# Patient Record
Sex: MALE | Race: BLACK OR AFRICAN AMERICAN | NOT HISPANIC OR LATINO | Employment: OTHER | URBAN - METROPOLITAN AREA
[De-identification: names, ages, dates, MRNs, and addresses within clinical notes are randomized per-mention and may not be internally consistent; named-entity substitution may affect disease eponyms.]

---

## 2020-09-02 LAB — HEMOCCULT STL QL IA: NEGATIVE

## 2020-10-06 ENCOUNTER — PATIENT MESSAGE (OUTPATIENT)
Dept: HEMATOLOGY/ONCOLOGY | Facility: CLINIC | Age: 75
End: 2020-10-06

## 2020-10-06 ENCOUNTER — OFFICE VISIT (OUTPATIENT)
Dept: HEMATOLOGY/ONCOLOGY | Facility: CLINIC | Age: 75
End: 2020-10-06
Payer: OTHER GOVERNMENT

## 2020-10-06 VITALS
BODY MASS INDEX: 19.23 KG/M2 | OXYGEN SATURATION: 100 % | WEIGHT: 137.38 LBS | HEIGHT: 71 IN | TEMPERATURE: 100 F | DIASTOLIC BLOOD PRESSURE: 62 MMHG | SYSTOLIC BLOOD PRESSURE: 143 MMHG | HEART RATE: 113 BPM

## 2020-10-06 DIAGNOSIS — G62.0 NEUROPATHY DUE TO CHEMOTHERAPEUTIC DRUG: ICD-10-CM

## 2020-10-06 DIAGNOSIS — R63.0 ANOREXIA: ICD-10-CM

## 2020-10-06 DIAGNOSIS — C79.51 PROSTATE CANCER METASTATIC TO BONE: Primary | ICD-10-CM

## 2020-10-06 DIAGNOSIS — G47.09 OTHER INSOMNIA: ICD-10-CM

## 2020-10-06 DIAGNOSIS — E11.9 TYPE 2 DIABETES MELLITUS WITHOUT COMPLICATION, WITHOUT LONG-TERM CURRENT USE OF INSULIN: ICD-10-CM

## 2020-10-06 DIAGNOSIS — T45.1X5A NEUROPATHY DUE TO CHEMOTHERAPEUTIC DRUG: ICD-10-CM

## 2020-10-06 DIAGNOSIS — D75.89 BICYTOPENIA: ICD-10-CM

## 2020-10-06 DIAGNOSIS — M62.441 CONTRACTURE OF MUSCLE OF RIGHT HAND: ICD-10-CM

## 2020-10-06 DIAGNOSIS — C61 PROSTATE CANCER METASTATIC TO BONE: Primary | ICD-10-CM

## 2020-10-06 PROCEDURE — 99999 PR PBB SHADOW E&M-NEW PATIENT-LVL V: ICD-10-PCS | Mod: PBBFAC,,, | Performed by: INTERNAL MEDICINE

## 2020-10-06 PROCEDURE — 99204 PR OFFICE/OUTPT VISIT, NEW, LEVL IV, 45-59 MIN: ICD-10-PCS | Mod: S$GLB,,, | Performed by: INTERNAL MEDICINE

## 2020-10-06 PROCEDURE — 99204 OFFICE O/P NEW MOD 45 MIN: CPT | Mod: S$GLB,,, | Performed by: INTERNAL MEDICINE

## 2020-10-06 PROCEDURE — 99999 PR PBB SHADOW E&M-NEW PATIENT-LVL V: CPT | Mod: PBBFAC,,, | Performed by: INTERNAL MEDICINE

## 2020-10-06 RX ORDER — METFORMIN HYDROCHLORIDE 500 MG/1
500 TABLET ORAL 2 TIMES DAILY WITH MEALS
COMMUNITY
End: 2020-11-05

## 2020-10-06 RX ORDER — PREGABALIN 75 MG/1
75 CAPSULE ORAL 2 TIMES DAILY
Qty: 60 CAPSULE | Refills: 6 | Status: SHIPPED | OUTPATIENT
Start: 2020-10-06 | End: 2020-10-08 | Stop reason: DRUGHIGH

## 2020-10-06 RX ORDER — DRONABINOL 2.5 MG/1
2.5 CAPSULE ORAL
COMMUNITY
End: 2020-10-06 | Stop reason: SDUPTHER

## 2020-10-06 RX ORDER — DRONABINOL 2.5 MG/1
2.5 CAPSULE ORAL
Qty: 90 CAPSULE | Refills: 5 | Status: SHIPPED | OUTPATIENT
Start: 2020-10-06 | End: 2020-10-07 | Stop reason: SDUPTHER

## 2020-10-06 RX ORDER — GABAPENTIN 100 MG/1
CAPSULE ORAL
COMMUNITY
End: 2020-10-06 | Stop reason: ALTCHOICE

## 2020-10-06 NOTE — Clinical Note
The patient will need labs in the morning on Thursday at 11 with CBC, CMP and PSA.  He will need CT scans of the C/A/P and a NM Bone scan after his labs are done.  He will need an appt with an orthopedist concerning contractures of his right hand.  He will need an appointment with an oncology nutritionist.  He will need a return appt with me once the scans are completed.

## 2020-10-06 NOTE — PROGRESS NOTES
PATIENT: Colten Franco  MRN: [id number]  DATE: 10/6/2020      Diagnosis:   1. Prostate cancer metastatic to bone    2. Bicytopenia    3. Contracture of muscle of right hand    4. Neuropathy due to chemotherapeutic drug    5. Anorexia    6. Type 2 diabetes mellitus without complication, without long-term current use of insulin    7. Other insomnia        Chief Complaint: Prostate Cancer        Subjective:    Initial History: Mr. Franco is a 75 y.o. male with neuropathy, DMII presents to establish care for metastatic prostate cancer.  The patient was originally diagnosed with prostate cancer in 1998 s/p prostatectomy.  He later developed metastatic cancer to the bone.  He was previously treated by Dr. Christiansen in [facility]. Selvin has been previously treated with Lupron, palliative radiation, provenge (xofigo), zytiga (abiratoerone) and prednisone, enzalutamide in combination with [id number] (EZH2 inhibitor) as part of a phase ½ trial, CKO871 trial (start 3/03/20) from which the patient developed hand and foot syndrome after the second cycle.  Most recently the patient was treated with casodex and Lupron starting 9/10/20 after his PSA continued to climb.  Last scans reported in his outside records on 4/03/20 showed extensive blastic osseous disease with no evidence of new disease and a stable AAA measuring 3.5cm.    The patient states he was recently admitted to the hospital in [place] from 8/27/20 to 9/03/20 for failure to thrive.  During this admission he states CT scans and a NM bone scan was done.  He states he also received a blood transfusion  Currently he states he has chronic pain in his feet and right hand which has been present since the OSG354 trial.  He is taking neurontin 100mg BID without relief.  He endorses weight loss and decrease in appetite.  He endorses occasional nausea.  The patient denies CP, SOB, abdominal pain, vomiting, constipation, diarrhea.  The patient denies fever, chills, night  sweats, new lumps or bumps, easy bruising or bleeding.  He states he has limited mobility and spends most of the day in a chair or bed.    Past Medical History:   Past Medical History:   Diagnosis Date    DMII (diabetes mellitus, type 2)     Neuropathy due to chemotherapeutic drug     Prostate cancer        Past Surgical HIstory:   Past Surgical History:   Procedure Laterality Date    PROSTATECTOMY         Family History: History reviewed. No pertinent family history.    Social History:  reports that he has been smoking cigarettes. He has a 25.00 pack-year smoking history. He does not have any smokeless tobacco history on file. He reports current alcohol use.    Allergies:  Review of patient's allergies indicates:  No Known Allergies    Medications:  Current Outpatient Medications   Medication Sig Dispense Refill    dronabinoL (MARINOL) 2.5 MG capsule Take 1 capsule (2.5 mg total) by mouth 2 (two) times daily before meals. Take 1 capsule (2.5mg total) by mouth in the AM before breakfast, Take 2 capsules (5mg total) by mouth in the PM before dinner. 90 capsule 5    metFORMIN (GLUCOPHAGE) 500 MG tablet Take 500 mg by mouth 2 (two) times daily with meals.      ondansetron (ZOFRAN) IVPB       pantoprazole sodium (PANTOPRAZOLE ORAL)       zolpidem 5 mg Subl       pregabalin (LYRICA) 75 MG capsule Take 1 capsule (75 mg total) by mouth 2 (two) times daily. 60 capsule 6     No current facility-administered medications for this visit.        Review of Systems   Constitutional: Positive for appetite change (food does not taste the same) and unexpected weight change. Negative for chills and fever.   HENT: Negative for sore throat and trouble swallowing.    Eyes: Negative for photophobia and visual disturbance.   Respiratory: Negative for cough, chest tightness and shortness of breath.    Cardiovascular: Negative for chest pain, palpitations and leg swelling.   Gastrointestinal: Positive for nausea (on occasion).  "Negative for abdominal pain, constipation, diarrhea and vomiting.   Endocrine: Negative for cold intolerance and heat intolerance.   Genitourinary: Negative for difficulty urinating, dysuria and hematuria.   Musculoskeletal: Negative for arthralgias, back pain and myalgias.   Skin: Negative for color change and rash.   Neurological: Negative for dizziness, light-headedness, numbness and headaches.        Pain in right fer and in feet.   Hematological: Negative for adenopathy. Does not bruise/bleed easily.       ECOG Performance Status: 3   Objective:      Vitals:   Vitals:    10/06/20 1441   BP: (!) 143/62   BP Location: Right arm   Patient Position: Sitting   BP Method: Medium (Automatic)   Pulse: (!) 113   Temp: 99.7 °F (37.6 °C)   TempSrc: Oral   SpO2: 100%   Weight: 62.3 kg (137 lb 5.6 oz)   Height: 5' 11" (1.803 m)       Physical Exam  Constitutional:       General: He is not in acute distress.     Appearance: He is well-developed. He is not diaphoretic.      Comments: Thin, cachectic   HENT:      Head: Normocephalic and atraumatic.   Eyes:      General: No scleral icterus.        Right eye: No discharge.         Left eye: No discharge.   Cardiovascular:      Rate and Rhythm: Normal rate and regular rhythm.      Heart sounds: Normal heart sounds. No murmur. No friction rub. No gallop.    Pulmonary:      Effort: Pulmonary effort is normal. No respiratory distress.      Breath sounds: Normal breath sounds. No wheezing or rales.   Chest:      Chest wall: No tenderness.   Abdominal:      General: Bowel sounds are normal. There is no distension.      Palpations: Abdomen is soft. There is no mass.      Tenderness: There is no abdominal tenderness. There is no rebound.   Musculoskeletal:         General: No tenderness.      Comments: Pt with contractures of the right hand with the fingers being unable to fully extend.  Pt with tightness in the right hand and feet   Skin:     General: Skin is warm and dry.      " Findings: No erythema or rash.   Neurological:      Mental Status: He is alert and oriented to person, place, and time.      Coordination: Coordination normal.   Psychiatric:         Behavior: Behavior normal.         Laboratory Data:  No visits with results within 1 Week(s) from this visit.   Latest known visit with results is:   No results found for any previous visit.         Imaging: Reviewed    Assessment:       1. Prostate cancer metastatic to bone    2. Bicytopenia    3. Contracture of muscle of right hand    4. Neuropathy due to chemotherapeutic drug    5. Anorexia    6. Type 2 diabetes mellitus without complication, without long-term current use of insulin    7. Other insomnia           Plan:     Prostate Cancer - The patient has metastatic prostate caner to the bone  -The patient was previously under the care of Dr Christiansen in [place], [place]  -He has received the following treatments: Lupron, palliative radiation, provenge (xofigo), zytiga (abiratoerone) and prednisone, enzalutamide in combination with [id number] (EZH2 inhibitor) as part of a phase ½ trial, HHU564 trial phase 1 (complicated with hand and foot)  -Last documented PSA in the outside records was 271ng/mL on 7/27/20  -The patient was started on Casodex and Lupron 9/10/20 by Dr Christiansen; however, the patient is not currently taking Casodex and last dose and strength of lupron is unclear based on outside records.  -Will get staging scans with NM bone scan and CT of the C/A/P  -Pt has a poor performance status which will affect future treatment decisions    Bicytopenia - The patient was previously noted to have anemia and thrombocytopenia  -HE states he received a blood transfusion during a recent hospitalization  -Will check blood counts  -Pt consented for blood transfusions today in case one is needed.    Contracture of the Right Hand - The patient has severe contractures of the right hand similar to that seen in scleroderma  -Will have the patient see  orthopedics to determine if there are any surgical procedures which can be done to help the patient with his dexterity    Neuropathic Pain - The patient is currently taking Gabapentin 100mg BID without relief  -HE states he was previously on lyrica which helped  -Will start the patient on lyrica 75mg BID and assess response.    Anorexia - Pt on marinol for appetite stimulation  -Will refill his prescription    DMII - PT on metformin  -No previous A1C  -Will monitor    Insomnia - pt on zolpidem but states it is not helping  -Will see if lyrica helps with neuropathic pain and insomnia before starting the patient on a different sleep aid    Advance Care Planning     Power of   I initiated the process of advance care planning today and explained the importance of this process to the patient.  I introduced the concept of advance directives to the patient, as well. Then the patient received detailed information about the importance of designating a Health Care Power of  (HCPOA). He was also instructed to communicate with this person about their wishes for future healthcare, should he become sick and lose decision-making capacity. The patient has not previously appointed a HCPOA. After our discussion, the patient has decided to complete a HCPOA and will bring the form completed to his next clinic appointment.          Follow Up - CBC, CMP and PSA this Thursday, CT scans of the C/A/P and a NM Bone scan, orthopedics appt, nutritionist appt, return appt with me once scans completed    Edgardo Alas MD  Hematology and Oncology  [facility]  Office:[phone]  Fax: [phone]

## 2020-10-07 DIAGNOSIS — R63.0 ANOREXIA: ICD-10-CM

## 2020-10-07 RX ORDER — DRONABINOL 2.5 MG/1
2.5 CAPSULE ORAL
Qty: 90 CAPSULE | Refills: 5 | Status: SHIPPED | OUTPATIENT
Start: 2020-10-07

## 2020-10-07 NOTE — TELEPHONE ENCOUNTER
----- Message from Edgardo Alas MD sent at 10/6/2020  3:30 PM CDT -----  The patient will need labs in the morning on Thursday at 11 with CBC, CMP and PSA.  He will need CT scans of the C/A/P and a NM Bone scan after his labs are done.  He will need an appt with an orthopedist concerning contractures of his right hand.  He will need an appointment with an oncology nutritionist.  He will need a return appt with me once the scans are completed.

## 2020-10-07 NOTE — TELEPHONE ENCOUNTER
Called pt to schedule NM bone scan. No answer. Left message for pt to call back to the office for scheduling. Pt will need to go to [facility].   ~Jesus

## 2020-10-08 ENCOUNTER — PATIENT MESSAGE (OUTPATIENT)
Dept: HEMATOLOGY/ONCOLOGY | Facility: CLINIC | Age: 75
End: 2020-10-08

## 2020-10-08 DIAGNOSIS — T45.1X5A NEUROPATHY DUE TO CHEMOTHERAPEUTIC DRUG: Primary | ICD-10-CM

## 2020-10-08 DIAGNOSIS — G62.0 NEUROPATHY DUE TO CHEMOTHERAPEUTIC DRUG: Primary | ICD-10-CM

## 2020-10-08 RX ORDER — PREGABALIN 25 MG/1
25 CAPSULE ORAL 2 TIMES DAILY
Qty: 90 CAPSULE | Refills: 2 | Status: SHIPPED | OUTPATIENT
Start: 2020-10-08 | End: 2021-10-08

## 2020-10-08 NOTE — TELEPHONE ENCOUNTER
The patient's daughter called and stated that her father has been having confusion since starting the Lyrica.  She states this morning he appeared to be having hallucinations and had difficulty urinating in the toilet. She also states he has been having fever with temperature up to 103F.  I instructed her to bring her father to the ER if he is having fever and confusion given the concern for potential meningitis.  She expressed understanding.  All questions were answered to her satisfaction.    Edgardo Alas MD  Hematology and Oncology  [facility]  Office:[phone]  Fax: [phone]

## 2020-10-09 ENCOUNTER — TELEPHONE (OUTPATIENT)
Dept: HEMATOLOGY/ONCOLOGY | Facility: CLINIC | Age: 75
End: 2020-10-09

## 2020-10-09 NOTE — TELEPHONE ENCOUNTER
TC to pts daughter deisi  Advised her bone scan machine I out of service on [facility] Inquired as to whether or not they can make an appointment for this procedure at [facility]  She agreed  TC to [facility] dept  Message left for Darinel to return call to schedule patient.  Pts dgt advised of this  Will contact her with appt date and time when arranged     TC to [facility]  appt scheduled for Thur Oct 15  For bone scan  at 10:30  Pts dgt notified Present to 2nd floor in hospital    Bone scan unit is now repaired so test changed from [facility] to her  Daughter of pt notified of such and agreed with arrangements

## 2020-10-12 ENCOUNTER — TELEPHONE (OUTPATIENT)
Dept: EMERGENCY MEDICINE | Facility: HOSPITAL | Age: 75
End: 2020-10-12

## 2020-10-12 ENCOUNTER — PATIENT MESSAGE (OUTPATIENT)
Dept: EMERGENCY MEDICINE | Facility: HOSPITAL | Age: 75
End: 2020-10-12

## 2020-10-12 ENCOUNTER — HOSPITAL ENCOUNTER (INPATIENT)
Facility: HOSPITAL | Age: 75
LOS: 3 days | Discharge: HOME | DRG: 723 | End: 2020-10-15
Attending: EMERGENCY MEDICINE | Admitting: HOSPITALIST
Payer: OTHER GOVERNMENT

## 2020-10-12 DIAGNOSIS — D64.9 ANEMIA: ICD-10-CM

## 2020-10-12 DIAGNOSIS — D64.9 SYMPTOMATIC ANEMIA: ICD-10-CM

## 2020-10-12 LAB
ABO + RH BLD: NORMAL
ALBUMIN SERPL BCP-MCNC: 2.3 G/DL (ref 3.5–5.2)
ALP SERPL-CCNC: 231 U/L (ref 55–135)
ALT SERPL W/O P-5'-P-CCNC: 13 U/L (ref 10–44)
ANION GAP SERPL CALC-SCNC: 10 MMOL/L (ref 8–16)
APTT BLDCRRT: 34.3 SEC (ref 21–32)
AST SERPL-CCNC: 13 U/L (ref 10–40)
BASOPHILS # BLD AUTO: 0.01 K/UL (ref 0–0.2)
BASOPHILS NFR BLD: 0.2 % (ref 0–1.9)
BILIRUB SERPL-MCNC: 0.2 MG/DL (ref 0.1–1)
BLD GP AB SCN CELLS X3 SERPL QL: NORMAL
BUN SERPL-MCNC: 22 MG/DL (ref 8–23)
CALCIUM SERPL-MCNC: 8.4 MG/DL (ref 8.7–10.5)
CHLORIDE SERPL-SCNC: 107 MMOL/L (ref 95–110)
CO2 SERPL-SCNC: 26 MMOL/L (ref 23–29)
CREAT SERPL-MCNC: 1.2 MG/DL (ref 0.5–1.4)
CTP QC/QA: YES
DIFFERENTIAL METHOD: ABNORMAL
EOSINOPHIL # BLD AUTO: 0.1 K/UL (ref 0–0.5)
EOSINOPHIL NFR BLD: 1.5 % (ref 0–8)
ERYTHROCYTE [DISTWIDTH] IN BLOOD BY AUTOMATED COUNT: 19.9 % (ref 11.5–14.5)
EST. GFR  (AFRICAN AMERICAN): >60 ML/MIN/1.73 M^2
EST. GFR  (NON AFRICAN AMERICAN): 60 ML/MIN/1.73 M^2
GLUCOSE SERPL-MCNC: 155 MG/DL (ref 70–110)
HCT VFR BLD AUTO: 17.8 % (ref 40–54)
HGB BLD-MCNC: 5.3 G/DL (ref 14–18)
IMM GRANULOCYTES # BLD AUTO: 0.11 K/UL (ref 0–0.04)
IMM GRANULOCYTES NFR BLD AUTO: 2.1 % (ref 0–0.5)
INR PPP: 1 (ref 0.8–1.2)
IRON SERPL-MCNC: 53 UG/DL (ref 45–160)
LDH SERPL L TO P-CCNC: 246 U/L (ref 110–260)
LYMPHOCYTES # BLD AUTO: 1 K/UL (ref 1–4.8)
LYMPHOCYTES NFR BLD: 18.2 % (ref 18–48)
MCH RBC QN AUTO: 27.6 PG (ref 27–31)
MCHC RBC AUTO-ENTMCNC: 29.8 G/DL (ref 32–36)
MCV RBC AUTO: 93 FL (ref 82–98)
MONOCYTES # BLD AUTO: 0.5 K/UL (ref 0.3–1)
MONOCYTES NFR BLD: 8.5 % (ref 4–15)
NEUTROPHILS # BLD AUTO: 3.7 K/UL (ref 1.8–7.7)
NEUTROPHILS NFR BLD: 69.5 % (ref 38–73)
NRBC BLD-RTO: 1 /100 WBC
PLATELET # BLD AUTO: 199 K/UL (ref 150–350)
PMV BLD AUTO: 8.8 FL (ref 9.2–12.9)
POTASSIUM SERPL-SCNC: 4.7 MMOL/L (ref 3.5–5.1)
PROT SERPL-MCNC: 6.2 G/DL (ref 6–8.4)
PROTHROMBIN TIME: 11.1 SEC (ref 9–12.5)
RBC # BLD AUTO: 1.92 M/UL (ref 4.6–6.2)
RETICS/RBC NFR AUTO: 1.5 % (ref 0.4–2)
SARS-COV-2 RDRP RESP QL NAA+PROBE: NEGATIVE
SATURATED IRON: 31 % (ref 20–50)
SODIUM SERPL-SCNC: 143 MMOL/L (ref 136–145)
TOTAL IRON BINDING CAPACITY: 169 UG/DL (ref 250–450)
TRANSFERRIN SERPL-MCNC: 114 MG/DL (ref 200–375)
TSH SERPL DL<=0.005 MIU/L-ACNC: 2.03 UIU/ML (ref 0.4–4)
WBC # BLD AUTO: 5.32 K/UL (ref 3.9–12.7)

## 2020-10-12 PROCEDURE — 85730 THROMBOPLASTIN TIME PARTIAL: CPT

## 2020-10-12 PROCEDURE — 83540 ASSAY OF IRON: CPT

## 2020-10-12 PROCEDURE — 84443 ASSAY THYROID STIM HORMONE: CPT

## 2020-10-12 PROCEDURE — 85025 COMPLETE CBC W/AUTO DIFF WBC: CPT | Mod: 91

## 2020-10-12 PROCEDURE — 83010 ASSAY OF HAPTOGLOBIN QUANT: CPT

## 2020-10-12 PROCEDURE — 99285 EMERGENCY DEPT VISIT HI MDM: CPT | Mod: 25

## 2020-10-12 PROCEDURE — 85045 AUTOMATED RETICULOCYTE COUNT: CPT

## 2020-10-12 PROCEDURE — 12000002 HC ACUTE/MED SURGE SEMI-PRIVATE ROOM

## 2020-10-12 PROCEDURE — U0002 COVID-19 LAB TEST NON-CDC: HCPCS | Performed by: EMERGENCY MEDICINE

## 2020-10-12 PROCEDURE — 86850 RBC ANTIBODY SCREEN: CPT

## 2020-10-12 PROCEDURE — 82746 ASSAY OF FOLIC ACID SERUM: CPT

## 2020-10-12 PROCEDURE — 86920 COMPATIBILITY TEST SPIN: CPT

## 2020-10-12 PROCEDURE — 82607 VITAMIN B-12: CPT

## 2020-10-12 PROCEDURE — 80053 COMPREHEN METABOLIC PANEL: CPT | Mod: 91

## 2020-10-12 PROCEDURE — 83615 LACTATE (LD) (LDH) ENZYME: CPT

## 2020-10-12 PROCEDURE — 85610 PROTHROMBIN TIME: CPT

## 2020-10-12 RX ORDER — HYDROCODONE BITARTRATE AND ACETAMINOPHEN 500; 5 MG/1; MG/1
TABLET ORAL
Status: DISCONTINUED | OUTPATIENT
Start: 2020-10-13 | End: 2020-10-15 | Stop reason: HOSPADM

## 2020-10-13 PROBLEM — Z72.0 TOBACCO ABUSE: Status: ACTIVE | Noted: 2020-10-13

## 2020-10-13 LAB
BASOPHILS # BLD AUTO: 0.01 K/UL (ref 0–0.2)
BASOPHILS NFR BLD: 0.2 % (ref 0–1.9)
BLD PROD TYP BPU: NORMAL
BLD PROD TYP BPU: NORMAL
BLOOD UNIT EXPIRATION DATE: NORMAL
BLOOD UNIT EXPIRATION DATE: NORMAL
BLOOD UNIT TYPE CODE: 5100
BLOOD UNIT TYPE CODE: 5100
BLOOD UNIT TYPE: NORMAL
BLOOD UNIT TYPE: NORMAL
CODING SYSTEM: NORMAL
CODING SYSTEM: NORMAL
DIFFERENTIAL METHOD: ABNORMAL
DISPENSE STATUS: NORMAL
DISPENSE STATUS: NORMAL
EOSINOPHIL # BLD AUTO: 0.1 K/UL (ref 0–0.5)
EOSINOPHIL NFR BLD: 1.2 % (ref 0–8)
ERYTHROCYTE [DISTWIDTH] IN BLOOD BY AUTOMATED COUNT: 17.8 % (ref 11.5–14.5)
FERRITIN SERPL-MCNC: 2555 NG/ML (ref 20–300)
FOLATE SERPL-MCNC: 5.9 NG/ML (ref 4–24)
HAPTOGLOB SERPL-MCNC: 450 MG/DL (ref 30–250)
HCT VFR BLD AUTO: 23.2 % (ref 40–54)
HGB BLD-MCNC: 7.3 G/DL (ref 14–18)
IMM GRANULOCYTES # BLD AUTO: 0.07 K/UL (ref 0–0.04)
IMM GRANULOCYTES NFR BLD AUTO: 1.2 % (ref 0–0.5)
LYMPHOCYTES # BLD AUTO: 0.8 K/UL (ref 1–4.8)
LYMPHOCYTES NFR BLD: 13.3 % (ref 18–48)
MCH RBC QN AUTO: 28.4 PG (ref 27–31)
MCHC RBC AUTO-ENTMCNC: 31.5 G/DL (ref 32–36)
MCV RBC AUTO: 90 FL (ref 82–98)
MONOCYTES # BLD AUTO: 0.4 K/UL (ref 0.3–1)
MONOCYTES NFR BLD: 6.9 % (ref 4–15)
NEUTROPHILS # BLD AUTO: 4.5 K/UL (ref 1.8–7.7)
NEUTROPHILS NFR BLD: 77.2 % (ref 38–73)
NRBC BLD-RTO: 1 /100 WBC
PLATELET # BLD AUTO: 132 K/UL (ref 150–350)
PMV BLD AUTO: 8.7 FL (ref 9.2–12.9)
POCT GLUCOSE: 103 MG/DL (ref 70–110)
POCT GLUCOSE: 142 MG/DL (ref 70–110)
POCT GLUCOSE: 89 MG/DL (ref 70–110)
POCT GLUCOSE: 92 MG/DL (ref 70–110)
RBC # BLD AUTO: 2.57 M/UL (ref 4.6–6.2)
TRANS ERYTHROCYTES VOL PATIENT: NORMAL ML
TRANS ERYTHROCYTES VOL PATIENT: NORMAL ML
VIT B12 SERPL-MCNC: 438 PG/ML (ref 210–950)
WBC # BLD AUTO: 5.81 K/UL (ref 3.9–12.7)

## 2020-10-13 PROCEDURE — 99222 1ST HOSP IP/OBS MODERATE 55: CPT | Mod: ,,, | Performed by: INTERNAL MEDICINE

## 2020-10-13 PROCEDURE — 11000001 HC ACUTE MED/SURG PRIVATE ROOM

## 2020-10-13 PROCEDURE — 99222 PR INITIAL HOSPITAL CARE,LEVL II: ICD-10-PCS | Mod: ,,, | Performed by: INTERNAL MEDICINE

## 2020-10-13 PROCEDURE — 82728 ASSAY OF FERRITIN: CPT

## 2020-10-13 PROCEDURE — A4216 STERILE WATER/SALINE, 10 ML: HCPCS | Performed by: HOSPITALIST

## 2020-10-13 PROCEDURE — 36430 TRANSFUSION BLD/BLD COMPNT: CPT

## 2020-10-13 PROCEDURE — 85025 COMPLETE CBC W/AUTO DIFF WBC: CPT

## 2020-10-13 PROCEDURE — P9021 RED BLOOD CELLS UNIT: HCPCS

## 2020-10-13 PROCEDURE — 63600175 PHARM REV CODE 636 W HCPCS: Performed by: INTERNAL MEDICINE

## 2020-10-13 PROCEDURE — 99223 PR INITIAL HOSPITAL CARE,LEVL III: ICD-10-PCS | Mod: ,,, | Performed by: INTERNAL MEDICINE

## 2020-10-13 PROCEDURE — 25000003 PHARM REV CODE 250: Performed by: HOSPITALIST

## 2020-10-13 PROCEDURE — 99223 1ST HOSP IP/OBS HIGH 75: CPT | Mod: ,,, | Performed by: INTERNAL MEDICINE

## 2020-10-13 PROCEDURE — C9113 INJ PANTOPRAZOLE SODIUM, VIA: HCPCS | Performed by: INTERNAL MEDICINE

## 2020-10-13 PROCEDURE — 36415 COLL VENOUS BLD VENIPUNCTURE: CPT

## 2020-10-13 RX ORDER — AMOXICILLIN 250 MG
1 CAPSULE ORAL DAILY
Status: DISCONTINUED | OUTPATIENT
Start: 2020-10-13 | End: 2020-10-15 | Stop reason: HOSPADM

## 2020-10-13 RX ORDER — SODIUM CHLORIDE 0.9 % (FLUSH) 0.9 %
3 SYRINGE (ML) INJECTION EVERY 8 HOURS
Status: DISCONTINUED | OUTPATIENT
Start: 2020-10-13 | End: 2020-10-15 | Stop reason: HOSPADM

## 2020-10-13 RX ORDER — PREGABALIN 25 MG/1
25 CAPSULE ORAL 2 TIMES DAILY
Status: DISCONTINUED | OUTPATIENT
Start: 2020-10-13 | End: 2020-10-13

## 2020-10-13 RX ORDER — DEXTROMETHORPHAN POLISTIREX 30 MG/5 ML
1 SUSPENSION, EXTENDED RELEASE 12 HR ORAL DAILY PRN
Status: DISCONTINUED | OUTPATIENT
Start: 2020-10-13 | End: 2020-10-15 | Stop reason: HOSPADM

## 2020-10-13 RX ORDER — PROMETHAZINE HYDROCHLORIDE 25 MG/1
25 SUPPOSITORY RECTAL EVERY 6 HOURS PRN
Status: DISCONTINUED | OUTPATIENT
Start: 2020-10-13 | End: 2020-10-15 | Stop reason: HOSPADM

## 2020-10-13 RX ORDER — TALC
9 POWDER (GRAM) TOPICAL NIGHTLY PRN
Status: DISCONTINUED | OUTPATIENT
Start: 2020-10-13 | End: 2020-10-15 | Stop reason: HOSPADM

## 2020-10-13 RX ORDER — DRONABINOL 2.5 MG/1
2.5 CAPSULE ORAL
Status: DISCONTINUED | OUTPATIENT
Start: 2020-10-13 | End: 2020-10-15 | Stop reason: HOSPADM

## 2020-10-13 RX ORDER — PANTOPRAZOLE SODIUM 40 MG/10ML
40 INJECTION, POWDER, LYOPHILIZED, FOR SOLUTION INTRAVENOUS 2 TIMES DAILY
Status: DISCONTINUED | OUTPATIENT
Start: 2020-10-13 | End: 2020-10-14

## 2020-10-13 RX ORDER — PANTOPRAZOLE SODIUM 40 MG/10ML
80 INJECTION, POWDER, LYOPHILIZED, FOR SOLUTION INTRAVENOUS ONCE
Status: COMPLETED | OUTPATIENT
Start: 2020-10-13 | End: 2020-10-13

## 2020-10-13 RX ORDER — MORPHINE SULFATE 4 MG/ML
5 INJECTION, SOLUTION INTRAMUSCULAR; INTRAVENOUS EVERY 4 HOURS PRN
Status: DISCONTINUED | OUTPATIENT
Start: 2020-10-13 | End: 2020-10-15 | Stop reason: HOSPADM

## 2020-10-13 RX ORDER — HYDROCODONE BITARTRATE AND ACETAMINOPHEN 5; 325 MG/1; MG/1
1 TABLET ORAL EVERY 4 HOURS PRN
Status: DISCONTINUED | OUTPATIENT
Start: 2020-10-13 | End: 2020-10-15 | Stop reason: HOSPADM

## 2020-10-13 RX ORDER — ACETAMINOPHEN 325 MG/1
650 TABLET ORAL EVERY 8 HOURS PRN
Status: DISCONTINUED | OUTPATIENT
Start: 2020-10-13 | End: 2020-10-15 | Stop reason: HOSPADM

## 2020-10-13 RX ORDER — ONDANSETRON 2 MG/ML
8 INJECTION INTRAMUSCULAR; INTRAVENOUS EVERY 8 HOURS PRN
Status: DISCONTINUED | OUTPATIENT
Start: 2020-10-13 | End: 2020-10-15 | Stop reason: HOSPADM

## 2020-10-13 RX ORDER — BISACODYL 10 MG
10 SUPPOSITORY, RECTAL RECTAL DAILY PRN
Status: DISCONTINUED | OUTPATIENT
Start: 2020-10-13 | End: 2020-10-15 | Stop reason: HOSPADM

## 2020-10-13 RX ORDER — POLYETHYLENE GLYCOL 3350 17 G/17G
17 POWDER, FOR SOLUTION ORAL DAILY PRN
Status: DISCONTINUED | OUTPATIENT
Start: 2020-10-13 | End: 2020-10-15 | Stop reason: HOSPADM

## 2020-10-13 RX ADMIN — HYDROCODONE BITARTRATE AND ACETAMINOPHEN 1 TABLET: 5; 325 TABLET ORAL at 05:10

## 2020-10-13 RX ADMIN — PANTOPRAZOLE SODIUM 80 MG: 40 INJECTION, POWDER, FOR SOLUTION INTRAVENOUS at 02:10

## 2020-10-13 RX ADMIN — Medication 3 ML: at 03:10

## 2020-10-13 RX ADMIN — PANTOPRAZOLE SODIUM 40 MG: 40 INJECTION, POWDER, FOR SOLUTION INTRAVENOUS at 08:10

## 2020-10-13 RX ADMIN — Medication 3 ML: at 07:10

## 2020-10-13 RX ADMIN — Medication 3 ML: at 09:10

## 2020-10-13 RX ADMIN — PREGABALIN 25 MG: 25 CAPSULE ORAL at 08:10

## 2020-10-13 NOTE — CONSULTS
[facility]  Gastroenterology  Consult Note    Patient Name: Colten Franco  MRN: [id number]  Admission Date: 10/12/2020  Hospital Length of Stay: 1 days  Code Status: Full Code   Attending Provider:   Consulting Provider: Silviano Martinez MD  Primary Care Physician: Primary Doctor No  Principal Problem:Symptomatic anemia    Consults  Subjective:     HPI:  This is a 75-year-old gentleman that we are consulting for symptomatic anemia.  Much of his prior medical care has been in [place], he is in the process of moving to [place] now.  He is vague on how long he has had any weakness, and downplays any significant weakness.  He says he has intermittent melena, but very infrequently perhaps once a month.  It is not persistent.  There has been no bright red blood per rectum.  He says his appetite is a little poor, but this is nothing new.  He denies any heartburn.  Denies any abdominal pain.  He does admit to taking ibuprofen for some hand pain.  He relates the hand pain to some medicine for his treatment of prostate cancer.  He denies any prior history of gastrointestinal bleeding or anemia requiring transfusion.  He says he had a normal colonoscopy about 5 years ago in [place].    On admission here he had a hemoglobin of 5.3.  It was normocytic.  Iron and ferritin were normal.  He received 2 units of packed RBCs and corrected appropriately to 7.3 g. he says he is feeling better now after the transfusion.    Past Medical History:   Diagnosis Date    DMII (diabetes mellitus, type 2)     Neuropathy due to chemotherapeutic drug     Prostate cancer        Past Surgical History:   Procedure Laterality Date    PROSTATECTOMY         Review of patient's allergies indicates:  No Known Allergies  Family History     None        Tobacco Use    Smoking status: Current Every Day Smoker     Packs/day: 0.50     Years: 50.00     Pack years: 25.00     Types: Cigarettes    Smokeless tobacco: Never Used    Substance and Sexual Activity    Alcohol use: Yes     Comment: on occasion    Drug use: Never    Sexual activity: Not on file     Review of Systems   Constitutional: Positive for appetite change. Negative for activity change, chills, diaphoresis, fatigue, fever and unexpected weight change.   HENT: Negative for congestion, ear pain, mouth sores, rhinorrhea, sinus pressure, sneezing, sore throat, trouble swallowing and voice change.    Eyes: Negative for pain.   Respiratory: Positive for shortness of breath. Negative for cough.    Cardiovascular: Negative for chest pain, palpitations and leg swelling.   Genitourinary: Negative for difficulty urinating and flank pain.   Musculoskeletal: Positive for joint swelling. Negative for arthralgias, back pain, gait problem, myalgias and neck pain.   Skin: Negative for rash.   Neurological: Negative for dizziness, tremors, syncope, numbness and headaches.   Hematological: Negative for adenopathy. Does not bruise/bleed easily.   Psychiatric/Behavioral: Negative for agitation, behavioral problems, confusion, decreased concentration and dysphoric mood.     Objective:     Vital Signs (Most Recent):  Temp: 98.5 °F (36.9 °C) (10/13/20 1117)  Pulse: 80 (10/13/20 1117)  Resp: 17 (10/13/20 1117)  BP: 118/63 (10/13/20 1117)  SpO2: 95 % (10/13/20 1117) Vital Signs (24h Range):  Temp:  [97.5 °F (36.4 °C)-98.6 °F (37 °C)] 98.5 °F (36.9 °C)  Pulse:  [80-94] 80  Resp:  [16-22] 17  SpO2:  [92 %-100 %] 95 %  BP: ()/(50-73) 118/63     Weight: 60.7 kg (133 lb 13.1 oz) (10/13/20 0118)  Body mass index is 18.66 kg/m².      Intake/Output Summary (Last 24 hours) at 10/13/2020 1610  Last data filed at 10/13/2020 1300  Gross per 24 hour   Intake 971.25 ml   Output --   Net 971.25 ml       Lines/Drains/Airways     Peripheral Intravenous Line                 Peripheral IV - Single Lumen 10/12/20 2145 20 G Right Forearm less than 1 day         Peripheral IV - Single Lumen 10/13/20 0025 20 G  Left Hand less than 1 day                Physical Exam  Constitutional:       General: He is not in acute distress.     Appearance: Normal appearance. He is well-developed. He is not diaphoretic.   HENT:      Right Ear: External ear normal.      Left Ear: External ear normal.      Nose: Nose normal.      Mouth/Throat:      Pharynx: No oropharyngeal exudate.   Eyes:      General: No scleral icterus.     Conjunctiva/sclera: Conjunctivae normal.      Pupils: Pupils are equal, round, and reactive to light.   Neck:      Musculoskeletal: Normal range of motion and neck supple.      Thyroid: No thyromegaly.   Cardiovascular:      Rate and Rhythm: Normal rate.      Heart sounds: Normal heart sounds. No murmur. No gallop.    Pulmonary:      Effort: Pulmonary effort is normal.      Breath sounds: Normal breath sounds. No wheezing.   Abdominal:      General: Bowel sounds are normal. There is no distension.      Palpations: Abdomen is soft. Abdomen is not rigid. There is hepatomegaly. There is no fluid wave or mass.      Tenderness: There is no abdominal tenderness. There is no guarding or rebound.   Genitourinary:     Comments: recent  Musculoskeletal: Normal range of motion.         General: No tenderness.   Lymphadenopathy:      Cervical: No cervical adenopathy.   Skin:     General: Skin is warm.      Findings: No rash.   Neurological:      Mental Status: He is alert and oriented to person, place, and time.      Cranial Nerves: No cranial nerve deficit.      Deep Tendon Reflexes: Reflexes are normal and symmetric.   Psychiatric:         Behavior: Behavior normal.         Thought Content: Thought content normal.         Significant Labs:  CBC:   Recent Labs   Lab 10/12/20  1313 10/12/20  2145 10/13/20  0932   WBC 4.84  5.05 5.32 5.81   HGB 5.6*  5.7* 5.3* 7.3*   HCT 19.9*  19.6* 17.8* 23.2*     206 199 132*       Significant Imaging:      Assessment/Plan:     Active Diagnoses:    Diagnosis Date Noted POA     PRINCIPAL PROBLEM:  Symptomatic anemia [D64.9] 10/12/2020 Yes    Tobacco abuse [Z72.0] 10/13/2020 Yes    Prostate cancer [C61]  Yes    DMII (diabetes mellitus, type 2) [E11.9]  Yes      Problems Resolved During this Admission:       Assessment.  Significant anemia likely due to subacute upper GI bleeding.  I discussed the importance of determining a source of the bleeding and recommended an EGD.  He had lunch today so will be hard for me to get a colonoscopy done, nor do I think he is willing to get a colonoscopy done at this time.    Thank you for your consult. I will follow-up with patient. Please contact us if you have any additional questions.    Silviano Martinez MD  Gastroenterology  [facility]

## 2020-10-13 NOTE — NURSING
Patient arrived to floor via stretcher. Patient oriented to floor and room. Basic setup placed at bedside.vital signs are stable. Patient has no complaints at this time. Will continue to monitor.

## 2020-10-13 NOTE — PLAN OF CARE
No falls or injuries noted. CBG with in normal limits No sign of pain or discomfort  Problem: Fall Injury Risk  Goal: Absence of Fall and Fall-Related Injury  Intervention: Identify and Manage Contributors to Fall Injury Risk  Flowsheets (Taken 10/13/2020 1522)  Self-Care Promotion:   independence encouraged   BADL personal routines maintained   BADL personal objects within reach  Medication Review/Management: medications reviewed     Problem: Adult Inpatient Plan of Care  Goal: Plan of Care Review  10/13/2020 1522 by Sepideh Cruz RN  Outcome: Ongoing, Progressing  Flowsheets (Taken 10/13/2020 1522)  Plan of Care Reviewed With:   patient   daughter     Problem: Diabetes Comorbidity  Goal: Blood Glucose Level Within Desired Range  Intervention: Maintain Glycemic Control  Flowsheets (Taken 10/13/2020 1522)  Glycemic Management:   blood glucose monitoring   oral hydration promoted

## 2020-10-13 NOTE — ASSESSMENT & PLAN NOTE
-The patient was found to have hemoglobin of 5.6g/dL yesterday and is s/p 2 units of PRBC's  -The patient complains of intermittent episodes of black stools concerning for melena  -The patient is currently on protonix and GI has been consulted  -Will follow up GI recs.  -Iron studies are suggestive of anemia of chronic disease  -B12 and folate are normal and haptoglobin and LDH are not suggestive of a hemolytic anemia.  -Will monitor

## 2020-10-13 NOTE — ASSESSMENT & PLAN NOTE
-The patient has advanced prostate cancer and is s/p multiple lines of treatment including Lupron, palliative radiation, provenge (xofigo), zytiga (abiratoerone) and prednisone, enzalutamide in combination with [id number] (EZH2 inhibitor) as part of a phase ½ trial, FPH129 trial (start 3/03/20), and most recently casodex and Lupron   -Pt is to have outpatient scans: NM bone scan and CT C/A/P on 10/16/20  -No need for inpatient treatment

## 2020-10-13 NOTE — SUBJECTIVE & OBJECTIVE
Oncology Treatment Plan:   [No treatment plan]    Medications:  Continuous Infusions:  Scheduled Meds:   dronabinoL  2.5 mg Oral BID AC    pantoprazole  40 mg Intravenous BID    pantoprazole  80 mg Intravenous Once    senna-docusate 8.6-50 mg  1 tablet Oral Daily    sodium chloride 0.9%  3 mL Intravenous Q8H     PRN Meds:sodium chloride, acetaminophen, bisacodyL, HYDROcodone-acetaminophen, influenza, melatonin, mineral oil, morphine, ondansetron, pneumoc 13-chelsy conj-dip cr(PF), polyethylene glycol, promethazine     Review of patient's allergies indicates:  No Known Allergies     Past Medical History:   Diagnosis Date    DMII (diabetes mellitus, type 2)     Neuropathy due to chemotherapeutic drug     Prostate cancer      Past Surgical History:   Procedure Laterality Date    PROSTATECTOMY       Family History     None        Tobacco Use    Smoking status: Current Every Day Smoker     Packs/day: 0.50     Years: 50.00     Pack years: 25.00     Types: Cigarettes    Smokeless tobacco: Never Used   Substance and Sexual Activity    Alcohol use: Yes     Comment: on occasion    Drug use: Never    Sexual activity: Not on file       Review of Systems   Constitutional: Positive for fatigue. Negative for chills and fever.   HENT: Negative for sore throat and trouble swallowing.    Eyes: Negative for photophobia and visual disturbance.   Respiratory: Negative for cough, chest tightness and shortness of breath.    Cardiovascular: Negative for chest pain, palpitations and leg swelling.   Gastrointestinal: Negative for abdominal pain, constipation, diarrhea, nausea and vomiting.        Melena   Endocrine: Negative for cold intolerance and heat intolerance.   Genitourinary: Negative for difficulty urinating, dysuria and hematuria.   Musculoskeletal: Negative for arthralgias, back pain and myalgias.   Skin: Negative for color change and rash.   Neurological: Negative for dizziness, light-headedness, numbness and  headaches.   Psychiatric/Behavioral: Positive for sleep disturbance.     Objective:     Vital Signs (Most Recent):  Temp: 98.5 °F (36.9 °C) (10/13/20 1117)  Pulse: 80 (10/13/20 1117)  Resp: 17 (10/13/20 1117)  BP: 118/63 (10/13/20 1117)  SpO2: 95 % (10/13/20 1117) Vital Signs (24h Range):  Temp:  [97.5 °F (36.4 °C)-98.6 °F (37 °C)] 98.5 °F (36.9 °C)  Pulse:  [80-94] 80  Resp:  [16-22] 17  SpO2:  [92 %-100 %] 95 %  BP: ()/(50-73) 118/63     Weight: 60.7 kg (133 lb 13.1 oz)  Body mass index is 18.66 kg/m².  Body surface area is 1.74 meters squared.      Intake/Output Summary (Last 24 hours) at 10/13/2020 1411  Last data filed at 10/13/2020 1300  Gross per 24 hour   Intake 971.25 ml   Output --   Net 971.25 ml       Physical Exam  Constitutional:       General: He is not in acute distress.     Appearance: He is well-developed. He is not diaphoretic.      Comments: Thin, cachectic   HENT:      Head: Normocephalic and atraumatic.   Eyes:      General: No scleral icterus.        Right eye: No discharge.         Left eye: No discharge.   Cardiovascular:      Rate and Rhythm: Normal rate and regular rhythm.      Heart sounds: Normal heart sounds. No murmur. No friction rub. No gallop.    Pulmonary:      Effort: Pulmonary effort is normal. No respiratory distress.      Breath sounds: Normal breath sounds. No wheezing or rales.   Chest:      Chest wall: No tenderness.   Abdominal:      General: Bowel sounds are normal. There is no distension.      Palpations: Abdomen is soft. There is no mass.      Tenderness: There is no abdominal tenderness. There is no rebound.   Musculoskeletal: Normal range of motion.         General: No tenderness.   Skin:     General: Skin is warm and dry.      Findings: No erythema or rash.   Neurological:      Mental Status: He is alert and oriented to person, place, and time.      Coordination: Coordination normal.   Psychiatric:         Behavior: Behavior normal.         Significant Labs:    Recent Results (from the past 24 hour(s))   CBC auto differential    Collection Time: 10/12/20  9:45 PM   Result Value Ref Range    WBC 5.32 3.90 - 12.70 K/uL    RBC 1.92 (L) 4.60 - 6.20 M/uL    Hemoglobin 5.3 (LL) 14.0 - 18.0 g/dL    Hematocrit 17.8 (LL) 40.0 - 54.0 %    Mean Corpuscular Volume 93 82 - 98 fL    Mean Corpuscular Hemoglobin 27.6 27.0 - 31.0 pg    Mean Corpuscular Hemoglobin Conc 29.8 (L) 32.0 - 36.0 g/dL    RDW 19.9 (H) 11.5 - 14.5 %    Platelets 199 150 - 350 K/uL    MPV 8.8 (L) 9.2 - 12.9 fL    Immature Granulocytes 2.1 (H) 0.0 - 0.5 %    Gran # (ANC) 3.7 1.8 - 7.7 K/uL    Immature Grans (Abs) 0.11 (H) 0.00 - 0.04 K/uL    Lymph # 1.0 1.0 - 4.8 K/uL    Mono # 0.5 0.3 - 1.0 K/uL    Eos # 0.1 0.0 - 0.5 K/uL    Baso # 0.01 0.00 - 0.20 K/uL    nRBC 1 (A) 0 /100 WBC    Gran% 69.5 38.0 - 73.0 %    Lymph% 18.2 18.0 - 48.0 %    Mono% 8.5 4.0 - 15.0 %    Eosinophil% 1.5 0.0 - 8.0 %    Basophil% 0.2 0.0 - 1.9 %    Differential Method Automated    Comprehensive metabolic panel    Collection Time: 10/12/20  9:45 PM   Result Value Ref Range    Sodium 143 136 - 145 mmol/L    Potassium 4.7 3.5 - 5.1 mmol/L    Chloride 107 95 - 110 mmol/L    CO2 26 23 - 29 mmol/L    Glucose 155 (H) 70 - 110 mg/dL    BUN, Bld 22 8 - 23 mg/dL    Creatinine 1.2 0.5 - 1.4 mg/dL    Calcium 8.4 (L) 8.7 - 10.5 mg/dL    Total Protein 6.2 6.0 - 8.4 g/dL    Albumin 2.3 (L) 3.5 - 5.2 g/dL    Total Bilirubin 0.2 0.1 - 1.0 mg/dL    Alkaline Phosphatase 231 (H) 55 - 135 U/L    AST 13 10 - 40 U/L    ALT 13 10 - 44 U/L    Anion Gap 10 8 - 16 mmol/L    eGFR if African American >60 >60 mL/min/1.73 m^2    eGFR if non African American 60 >60 mL/min/1.73 m^2   Type & Screen    Collection Time: 10/12/20  9:45 PM   Result Value Ref Range    Group & Rh O POS     Indirect Rita NEG    Reticulocytes    Collection Time: 10/12/20  9:45 PM   Result Value Ref Range    Retic 1.5 0.4 - 2.0 %   Folate    Collection Time: 10/12/20  9:45 PM   Result Value Ref  Range    Folate 5.9 4.0 - 24.0 ng/mL   Vitamin B12    Collection Time: 10/12/20  9:45 PM   Result Value Ref Range    Vitamin B-12 438 210 - 950 pg/mL   Iron and TIBC    Collection Time: 10/12/20  9:45 PM   Result Value Ref Range    Iron 53 45 - 160 ug/dL    Transferrin 114 (L) 200 - 375 mg/dL    TIBC 169 (L) 250 - 450 ug/dL    Saturated Iron 31 20 - 50 %   Lactate dehydrogenase    Collection Time: 10/12/20  9:45 PM   Result Value Ref Range     110 - 260 U/L   TSH    Collection Time: 10/12/20  9:45 PM   Result Value Ref Range    TSH 2.032 0.400 - 4.000 uIU/mL   Haptoglobin    Collection Time: 10/12/20  9:45 PM   Result Value Ref Range    Haptoglobin 450 (H) 30 - 250 mg/dL   Prepare RBC 2 Units; hgb 5.6    Collection Time: 10/12/20  9:45 PM   Result Value Ref Range    UNIT NUMBER [id number]     Product Code T9940P38     DISPENSE STATUS ISSUED     CODING SYSTEM BGTK092     Unit Blood Type Code 5100     Unit Blood Type O POS     Unit Expiration [id number]     UNIT NUMBER [id number]     Product Code N4619A55     DISPENSE STATUS ISSUED     CODING SYSTEM [id number]     Unit Blood Type Code 5100     Unit Blood Type O POS     Unit Expiration [id number]    Protime-INR    Collection Time: 10/12/20 10:25 PM   Result Value Ref Range    Prothrombin Time 11.1 9.0 - 12.5 sec    INR 1.0 0.8 - 1.2   APTT    Collection Time: 10/12/20 10:25 PM   Result Value Ref Range    aPTT 34.3 (H) 21.0 - 32.0 sec   POCT COVID-19 Rapid Screening    Collection Time: 10/12/20 11:37 PM   Result Value Ref Range    POC Rapid COVID Negative Negative     Acceptable Yes    POCT glucose    Collection Time: 10/13/20  1:42 AM   Result Value Ref Range    POCT Glucose 142 (H) 70 - 110 mg/dL   POCT glucose    Collection Time: 10/13/20  7:38 AM   Result Value Ref Range    POCT Glucose 92 70 - 110 mg/dL   CBC auto differential    Collection Time: 10/13/20  9:32 AM   Result Value Ref Range    WBC 5.81 3.90 - 12.70 K/uL    RBC 2.57 (L)  4.60 - 6.20 M/uL    Hemoglobin 7.3 (L) 14.0 - 18.0 g/dL    Hematocrit 23.2 (L) 40.0 - 54.0 %    Mean Corpuscular Volume 90 82 - 98 fL    Mean Corpuscular Hemoglobin 28.4 27.0 - 31.0 pg    Mean Corpuscular Hemoglobin Conc 31.5 (L) 32.0 - 36.0 g/dL    RDW 17.8 (H) 11.5 - 14.5 %    Platelets 132 (L) 150 - 350 K/uL    MPV 8.7 (L) 9.2 - 12.9 fL    Immature Granulocytes 1.2 (H) 0.0 - 0.5 %    Gran # (ANC) 4.5 1.8 - 7.7 K/uL    Immature Grans (Abs) 0.07 (H) 0.00 - 0.04 K/uL    Lymph # 0.8 (L) 1.0 - 4.8 K/uL    Mono # 0.4 0.3 - 1.0 K/uL    Eos # 0.1 0.0 - 0.5 K/uL    Baso # 0.01 0.00 - 0.20 K/uL    nRBC 1 (A) 0 /100 WBC    Gran% 77.2 (H) 38.0 - 73.0 %    Lymph% 13.3 (L) 18.0 - 48.0 %    Mono% 6.9 4.0 - 15.0 %    Eosinophil% 1.2 0.0 - 8.0 %    Basophil% 0.2 0.0 - 1.9 %    Differential Method Automated    Ferritin    Collection Time: 10/13/20  9:32 AM   Result Value Ref Range    Ferritin 2,555 (H) 20.0 - 300.0 ng/mL   POCT glucose    Collection Time: 10/13/20 11:19 AM   Result Value Ref Range    POCT Glucose 89 70 - 110 mg/dL     Diagnostic Results:  I have reviewed all pertinent imaging results/findings within the past 24 hours.

## 2020-10-13 NOTE — NURSING
Spoke to patient and  Daughter.informed them that he is NPO after midnight.  Protonix given via iv. No active  bleeding present

## 2020-10-13 NOTE — CARE UPDATE
"I personally saw and examined Mr Franco today. He presents with symptomatic anemia. Is s/p 2 units of PRBCs with adequate response. Vitals are stable. When asked, patient stated noticing intermittent "black" stools recently. Had one BM today which is described as "dark". Denies nausea, vomiting, abdominal pain or diarrhea. Abdominal exam benign. He states feeling better today. Denies hematuria. Denies taking aspirin, antiplatelets, NSAIDs or blood thinners. States last had colonoscopy 5 years ago and was not told of any abnormal findings. Will start pantoprazole 80 mg IV once, then pantoprazole 40 mg IV BID and consult gastroenterology. NPO at midnight in case endoscopy planned for tomorrow. CBC in AM.     Assessment and plan discussed with patient and daughter at bedside. All questions answered to satisfaction.   "

## 2020-10-13 NOTE — H&P
[facility] Medicine  History & Physical    Patient Name: Colten Franco  MRN: [id number]  Admission Date: 10/13/2020  Attending Physician: Michael Mcintosh MD, MPH      PCP:     Primary Doctor No    CC:     Chief Complaint   Patient presents with    abnormal lab     pt present to the ED c/o abnormal labs. the pt PCP called the patient with abnormal labs results and told them to come to the ED for a blood transfusion.     Weakness       HISTORY OF PRESENT ILLNESS:     Colten Franco is a 75 y.o. male that (in part)  has a past medical history of DMII (diabetes mellitus, type 2), Neuropathy due to chemotherapeutic drug, and Prostate cancer.  has a past surgical history that includes Prostatectomy. Presents to [facility] Emergency Department after receiving a phone call from his primary care physician who stated that the patient had abnormal lab results.  He was anemic.  His hemoglobin was 5.6.  Patient complains of generalized weakness, fatigue, shortness of breath at rest, and dyspnea with exertion.  Some dizziness when he stands too quickly.  Denies hemoptysis, hematemesis, melena, hematochezia, or hematuria.  No other obvious blood loss.  History of prostate cancer and has received multiple rounds of chemotherapy as well as palliative radiation.  He is being followed by Hematology-Oncology as an outpatient.  He was recently seen by Dr. CADEN Alas.     In the emergency department routine laboratory studies confirmed normocytic anemia with a hemoglobin of 5.3.  No other significant lab abnormalities noted other than decreased TIBC and transferrin.  2 units of PRBCs were ordered for transfusion.  Will check after 2nd unit.  Patient understands he may need additional blood after that.  Recommendations given by Hematology-Oncology for further anemia workup.    Hospital medicine has been asked to admit to inpatient for further evaluation and treatment.        REVIEW OF SYSTEMS:     -- Constitutional:  Generalized fatigue and malaise.  No fever or chills.  -- Eyes: No visual changes, diplopia, pain, tearing, blind spots, or discharge.   -- Ears, nose, mouth, throat, and face: No congestion, sore throat, epistaxis, d/c, bleeding gums, neck stiffness masses, or dental issues.  -- Respiratory:  Positive shortness of breath.  No cough,  hemoptysis, stridor, wheezing, or night sweats.  -- Cardiovascular:  Positive for dyspnea with exertion.  No chest pain,yncope, PND, edema, cyanosis, or palpitations.   -- Gastrointestinal: No vomiting, abdominal pain, hematemesis, melena, dyspepsia, or change in bowel habits.  -- Genitourinary:  Prostate cancer.  No hematuria, dysuria , stones, or incontinence.  -- Integument/breast: No rash, pruritis, pigmentation changes, dryness, or changes in hair.  -- Hematologic/lymphatic: No easy bruising or lymphadenopathy.   -- Musculoskeletal: .  Chronic arthralgias.  Contracture right hand.  Tightness in bilateral feet.  Diffuse chronic generalized muscle weakness without focal deficit.   No acute arthralgias, acute myalgias, joint swelling, acute limitations of ROM-- Neurological:  Neuropathy.  No seizures, headaches, incoordination, paraesthesias, ataxia, vertigo, or tremors.  -- Behavioral/Psych: No auditory or visual hallucinations, depression, or suicidal/homicidal ideations.  -- Endocrine: No heat or cold intolerance, polydipsia.  Unintentional weight loss  -- Allergy/Immunologic: No recurrent infections or adverse reaction to food, insects, or difficulty breathing.      PAST MEDICAL / SURGICAL HISTORY:     Past Medical History:   Diagnosis Date    DMII (diabetes mellitus, type 2)     Neuropathy due to chemotherapeutic drug     Prostate cancer      Past Surgical History:   Procedure Laterality Date    PROSTATECTOMY           FAMILY HISTORY:     Family history cardiovascular disease      SOCIAL HISTORY:     Social History      Socioeconomic History    Marital status: Single     Spouse name: Not on file    Number of children: Not on file    Years of education: Not on file    Highest education level: Not on file   Occupational History    Not on file   Social Needs    Financial resource strain: Not on file    Food insecurity     Worry: Not on file     Inability: Not on file    Transportation needs     Medical: Not on file     Non-medical: Not on file   Tobacco Use    Smoking status: Current Every Day Smoker     Packs/day: 0.50     Years: 50.00     Pack years: 25.00     Types: Cigarettes    Smokeless tobacco: Never Used   Substance and Sexual Activity    Alcohol use: Yes     Comment: on occasion    Drug use: Never    Sexual activity: Not on file   Lifestyle    Physical activity     Days per week: Not on file     Minutes per session: Not on file    Stress: Not on file   Relationships    Social connections     Talks on phone: Not on file     Gets together: Not on file     Attends [employer] service: Not on file     Active member of club or organization: Not on file     Attends meetings of clubs or organizations: Not on file     Relationship status: Not on file   Other Topics Concern    Not on file   Social History Narrative    Not on file         ALLERGIES:       Review of patient's allergies indicates:  No Known Allergies      HEALTH SCREENING:     Influenza vaccine not up-to-date for this season.  Prevnar 13 pneumonia vaccine = no evidence of previous vaccination found in the medical record      HOME MEDICATIONS:     Prior to Admission medications    Medication Sig Start Date End Date Taking? Authorizing Provider   metFORMIN (GLUCOPHAGE) 500 MG tablet Take 500 mg by mouth 2 (two) times daily with meals.   Yes Historical Provider   pregabalin (LYRICA) 25 MG capsule Take 1 capsule (25 mg total) by mouth 2 (two) times daily. 10/8/20 10/8/21 Yes Edgardo Alas MD   dronabinoL (MARINOL) 2.5 MG capsule Take 1 capsule (2.5 mg  "total) by mouth 2 (two) times daily before meals. Take 1 capsule (2.5mg total) by mouth in the AM before breakfast, Take 2 capsules (5mg total) by mouth in the PM before dinner. 10/7/20   Edgardo Alas MD   ondansetron (ZOFRAN) IVPB     Historical Provider   pantoprazole sodium (PANTOPRAZOLE ORAL)     Historical Provider   zolpidem 5 mg Subl     Historical Provider          HOSPITAL MEDICATIONS:     Scheduled Meds:    dronabinoL  2.5 mg Oral BID AC    pregabalin  25 mg Oral BID    senna-docusate 8.6-50 mg  1 tablet Oral Daily    sodium chloride 0.9%  3 mL Intravenous Q8H     Continuous Infusions:   PRN Meds: sodium chloride, acetaminophen, bisacodyL, HYDROcodone-acetaminophen, influenza, melatonin, mineral oil, morphine, ondansetron, pneumoc 13-chelsy conj-dip cr(PF), polyethylene glycol, promethazine      PHYSICAL EXAM:     Wt Readings from Last 1 Encounters:   10/13/20 0118 60.7 kg (133 lb 13.1 oz)   10/12/20 2119 60.3 kg (133 lb)     Body mass index is 18.66 kg/m².  Vitals:    10/13/20 0042 10/13/20 0047 10/13/20 0049 10/13/20 0118   BP: (!) 91/53 (!) 86/52 (!) 85/52 117/73   BP Location:    Left arm   Patient Position:    Lying   Pulse: 94 90 90 84   Resp: (!) 22 (!) 21 20 18   Temp:   97.5 °F (36.4 °C) 98.3 °F (36.8 °C)   TempSrc:   Oral Oral   SpO2: 100% 100% 99% (!) 93%   Weight:    60.7 kg (133 lb 13.1 oz)   Height:    5' 11" (1.803 m)          -- General appearance:  Thin, cachectic appearing male who is lying in bed.  No apparent distress.  well developed. appears stated age   -- Head: normocephalic, atraumatic   -- Eyes:  Pale conjunctivae . Extraocular muscles intact  -- Nose: Nares normal. Septum midline.   -- Mouth/Throat: lips, mucosa, and tongue normal. no throat erythema.   -- Neck: NoJVD,  Supple, symmetrical, trachea midline, thyroid not grossly enlarged, appears symmetric  -- Lungs: clear to auscultation bilaterally. normal respiratory effort. No use of accessory muscles.   -- Chest wall: no " tenderness. equal bilateral chest rise   -- Heart:  Rapid rate and regular rhythm. S1, S2 normal.  no click, rub or gallop   -- Abdomen: soft, non-tender, non-distended, non-tympanic; bowel sounds normal; no masses  -- Extremities:  Contractures of the right hand.  Tightness in feet.  no cyanosis, clubbing or edema.   -- Pulses: 2+ and symmetric   -- Skin:  Turgor normal. Color normal. Texture normal. No rashes or lesions.   -- Neurologic:  Neuropathy.  Globally decreased muscle strength and tone. No focal numbness or weakness. CNII-XII intact. Salyer coma scale: eyes open spontaneously-4, oriented & converses-5, obeys commands-6.      LABORATORY STUDIES:     Recent Results (from the past 36 hour(s))   CBC auto differential    Collection Time: 10/12/20  1:13 PM   Result Value Ref Range    WBC 4.84 3.90 - 12.70 K/uL    RBC 2.06 (L) 4.60 - 6.20 M/uL    Hemoglobin 5.6 (LL) 14.0 - 18.0 g/dL    Hematocrit 19.9 (LL) 40.0 - 54.0 %    Mean Corpuscular Volume 97 82 - 98 fL    Mean Corpuscular Hemoglobin 27.2 27.0 - 31.0 pg    Mean Corpuscular Hemoglobin Conc 28.1 (L) 32.0 - 36.0 g/dL    RDW 19.9 (H) 11.5 - 14.5 %    Platelets 212 150 - 350 K/uL    MPV 10.3 9.2 - 12.9 fL    Immature Granulocytes 2.1 (H) 0.0 - 0.5 %    Gran # (ANC) 3.6 1.8 - 7.7 K/uL    Immature Grans (Abs) 0.10 (H) 0.00 - 0.04 K/uL    Lymph # 0.8 (L) 1.0 - 4.8 K/uL    Mono # 0.3 0.3 - 1.0 K/uL    Eos # 0.1 0.0 - 0.5 K/uL    Baso # 0.01 0.00 - 0.20 K/uL    nRBC 1 (A) 0 /100 WBC    Gran% 74.4 (H) 38.0 - 73.0 %    Lymph% 15.7 (L) 18.0 - 48.0 %    Mono% 6.2 4.0 - 15.0 %    Eosinophil% 1.4 0.0 - 8.0 %    Basophil% 0.2 0.0 - 1.9 %    Platelet Estimate Appears normal     Aniso Slight     Poik Slight     Poly Occasional     Hypo Occasional     Ovalocytes Occasional     Differential Method Automated    Comprehensive Metabolic Panel    Collection Time: 10/12/20  1:13 PM   Result Value Ref Range    Sodium 143 136 - 145 mmol/L    Potassium 4.3 3.5 - 5.1 mmol/L     Chloride 107 95 - 110 mmol/L    CO2 25 23 - 29 mmol/L    Glucose 175 (H) 70 - 110 mg/dL    BUN, Bld 21 8 - 23 mg/dL    Creatinine 1.2 0.5 - 1.4 mg/dL    Calcium 9.1 8.7 - 10.5 mg/dL    Total Protein 6.6 6.0 - 8.4 g/dL    Albumin 2.3 (L) 3.5 - 5.2 g/dL    Total Bilirubin 0.2 0.1 - 1.0 mg/dL    Alkaline Phosphatase 242 (H) 55 - 135 U/L    AST 11 10 - 40 U/L    ALT 14 10 - 44 U/L    Anion Gap 11 8 - 16 mmol/L    eGFR if African American >60.0 >60 mL/min/1.73 m^2    eGFR if non  59.6 (A) >60 mL/min/1.73 m^2   Prostate Specific Antigen, Diagnostic    Collection Time: 10/12/20  1:13 PM   Result Value Ref Range    PSA DIAGNOSTIC 927.4 (H) 0.00 - 4.00 ng/mL   CBC auto differential    Collection Time: 10/12/20  1:13 PM   Result Value Ref Range    WBC 5.05 3.90 - 12.70 K/uL    RBC 2.02 (L) 4.60 - 6.20 M/uL    Hemoglobin 5.7 (LL) 14.0 - 18.0 g/dL    Hematocrit 19.6 (LL) 40.0 - 54.0 %    Mean Corpuscular Volume 97 82 - 98 fL    Mean Corpuscular Hemoglobin 28.2 27.0 - 31.0 pg    Mean Corpuscular Hemoglobin Conc 29.1 (L) 32.0 - 36.0 g/dL    RDW 19.9 (H) 11.5 - 14.5 %    Platelets 206 150 - 350 K/uL    MPV 9.7 9.2 - 12.9 fL    Immature Granulocytes 2.2 (H) 0.0 - 0.5 %    Gran # (ANC) 3.8 1.8 - 7.7 K/uL    Immature Grans (Abs) 0.11 (H) 0.00 - 0.04 K/uL    Lymph # 0.8 (L) 1.0 - 4.8 K/uL    Mono # 0.3 0.3 - 1.0 K/uL    Eos # 0.1 0.0 - 0.5 K/uL    Baso # 0.01 0.00 - 0.20 K/uL    nRBC 1 (A) 0 /100 WBC    Gran% 74.7 (H) 38.0 - 73.0 %    Lymph% 15.6 (L) 18.0 - 48.0 %    Mono% 6.1 4.0 - 15.0 %    Eosinophil% 1.2 0.0 - 8.0 %    Basophil% 0.2 0.0 - 1.9 %    Differential Method Automated    Sedimentation rate    Collection Time: 10/12/20  1:13 PM   Result Value Ref Range    Sed Rate 63 (H) 0 - 23 mm/Hr   C-REACTIVE PROTEIN    Collection Time: 10/12/20  1:13 PM   Result Value Ref Range    .1 (H) 0.0 - 8.2 mg/L   CBC auto differential    Collection Time: 10/12/20  9:45 PM   Result Value Ref Range    WBC 5.32 3.90 - 12.70  K/uL    RBC 1.92 (L) 4.60 - 6.20 M/uL    Hemoglobin 5.3 (LL) 14.0 - 18.0 g/dL    Hematocrit 17.8 (LL) 40.0 - 54.0 %    Mean Corpuscular Volume 93 82 - 98 fL    Mean Corpuscular Hemoglobin 27.6 27.0 - 31.0 pg    Mean Corpuscular Hemoglobin Conc 29.8 (L) 32.0 - 36.0 g/dL    RDW 19.9 (H) 11.5 - 14.5 %    Platelets 199 150 - 350 K/uL    MPV 8.8 (L) 9.2 - 12.9 fL    Immature Granulocytes 2.1 (H) 0.0 - 0.5 %    Gran # (ANC) 3.7 1.8 - 7.7 K/uL    Immature Grans (Abs) 0.11 (H) 0.00 - 0.04 K/uL    Lymph # 1.0 1.0 - 4.8 K/uL    Mono # 0.5 0.3 - 1.0 K/uL    Eos # 0.1 0.0 - 0.5 K/uL    Baso # 0.01 0.00 - 0.20 K/uL    nRBC 1 (A) 0 /100 WBC    Gran% 69.5 38.0 - 73.0 %    Lymph% 18.2 18.0 - 48.0 %    Mono% 8.5 4.0 - 15.0 %    Eosinophil% 1.5 0.0 - 8.0 %    Basophil% 0.2 0.0 - 1.9 %    Differential Method Automated    Comprehensive metabolic panel    Collection Time: 10/12/20  9:45 PM   Result Value Ref Range    Sodium 143 136 - 145 mmol/L    Potassium 4.7 3.5 - 5.1 mmol/L    Chloride 107 95 - 110 mmol/L    CO2 26 23 - 29 mmol/L    Glucose 155 (H) 70 - 110 mg/dL    BUN, Bld 22 8 - 23 mg/dL    Creatinine 1.2 0.5 - 1.4 mg/dL    Calcium 8.4 (L) 8.7 - 10.5 mg/dL    Total Protein 6.2 6.0 - 8.4 g/dL    Albumin 2.3 (L) 3.5 - 5.2 g/dL    Total Bilirubin 0.2 0.1 - 1.0 mg/dL    Alkaline Phosphatase 231 (H) 55 - 135 U/L    AST 13 10 - 40 U/L    ALT 13 10 - 44 U/L    Anion Gap 10 8 - 16 mmol/L    eGFR if African American >60 >60 mL/min/1.73 m^2    eGFR if non African American 60 >60 mL/min/1.73 m^2   Type & Screen    Collection Time: 10/12/20  9:45 PM   Result Value Ref Range    Group & Rh O POS     Indirect Rita NEG    Reticulocytes    Collection Time: 10/12/20  9:45 PM   Result Value Ref Range    Retic 1.5 0.4 - 2.0 %   Iron and TIBC    Collection Time: 10/12/20  9:45 PM   Result Value Ref Range    Iron 53 45 - 160 ug/dL    Transferrin 114 (L) 200 - 375 mg/dL    TIBC 169 (L) 250 - 450 ug/dL    Saturated Iron 31 20 - 50 %   Lactate  dehydrogenase    Collection Time: 10/12/20  9:45 PM   Result Value Ref Range     110 - 260 U/L   TSH    Collection Time: 10/12/20  9:45 PM   Result Value Ref Range    TSH 2.032 0.400 - 4.000 uIU/mL   Prepare RBC 2 Units; hgb 5.6    Collection Time: 10/12/20  9:45 PM   Result Value Ref Range    UNIT NUMBER [id number]     Product Code O7069U57     DISPENSE STATUS ISSUED     CODING SYSTEM [id number]     Unit Blood Type Code 5100     Unit Blood Type O POS     Unit Expiration [id number]     UNIT NUMBER [id number]     Product Code H0350I55     DISPENSE STATUS CROSSMATCHED     CODING SYSTEM [id number]     Unit Blood Type Code 5100     Unit Blood Type O POS     Unit Expiration [id number]    Protime-INR    Collection Time: 10/12/20 10:25 PM   Result Value Ref Range    Prothrombin Time 11.1 9.0 - 12.5 sec    INR 1.0 0.8 - 1.2   APTT    Collection Time: 10/12/20 10:25 PM   Result Value Ref Range    aPTT 34.3 (H) 21.0 - 32.0 sec   POCT COVID-19 Rapid Screening    Collection Time: 10/12/20 11:37 PM   Result Value Ref Range    POC Rapid COVID Negative Negative     Acceptable Yes    POCT glucose    Collection Time: 10/13/20  1:42 AM   Result Value Ref Range    POCT Glucose 142 (H) 70 - 110 mg/dL       Lab Results   Component Value Date    INR 1.0 10/12/2020     No results found for: HGBA1C  Recent Labs     10/13/20  0142   POCTGLUCOSE 142*             CONSULTS:     IP CONSULT TO HEM/ONC       ASSESSMENT & PLAN:     Primary Diagnosis:  Symptomatic anemia    Active Hospital Problems    Diagnosis  POA    *Symptomatic anemia [D64.9]  Yes     Priority: 1 - High    Tobacco abuse [Z72.0]  Yes    Prostate cancer [C61]  Yes    DMII (diabetes mellitus, type 2) [E11.9]  Yes      Resolved Hospital Problems   No resolved problems to display.         Arterial hypotension secondary to Symptomatic anemia  · Secondary to chronic disease and treatment with chemotherapy for prostate cancer metastatic to bone  · Receiving  "2 units of PRBCs.  Will redraw.  Patient may need 3rd unit.  · Anemia labs pending.  · Seen by heme Onc    Prostate cancer metastatic to bone  · Followed outpatient by Dr. CADEN Alas  · Per notes: "previously treated with Lupron, palliative radiation, provenge (xofigo), zytiga (abiratoerone) and prednisone, enzalutamide in combination with [id number] (EZH2 inhibitor) as part of a phase ½ trial, FKI358 trial (start 3/03/20) from which the patient developed hand and foot syndrome after the second cycle.  Most recently the patient was treated with casodex and Lupron starting 9/10/20 after his PSA continued to climb.  Last scans reported in his outside records on 4/03/20 showed extensive blastic osseous disease with no evidence of new disease and a stable AAA measuring 3.5cm."    Diabetes mellitus type 2  · BG in acceptable range at this time  · Maintain w/ subcutaneous insulin management order set  · Hold oral diabetic meds  · ADA 1800 kcal diet  · BG goal while in patient is <180mg/dL  · HgA1c = Pending    Tobacco abuse   · Chronic tobacco use  · Tobacco cessation counseling  · Nicotine patch offered; consider Wellbutrin or Chantix through PCP as an outpatient (will require closer monitoring)  · I discussed with the patient regarding the hazardous effects of smoking on increasing risk of heart attack and stroke, worsening lung functions, and increasing cancer risk.   Patient was urged to stop smoking now.  I also offered nicotine taper (such as nicotine patch and gum) to help ease the craving to smoke.          VTE Risk Mitigation (From admission, onward)         Ordered     IP VTE HIGH RISK PATIENT  Once      10/13/20 0212     Place TRUNG hose  Until discontinued      10/13/20 0212     Place sequential compression device  Until discontinued      10/13/20 0212                  Adult PRN medications available   DVT prophylaxis given       DISPOSITION:     Will admit to the Hospital Medicine service for further evaluation " and treatment.  Onto asphaltChart reviewed and updated where applicable.    High Risk Conditions:  Patient has a condition that poses threat to life and bodily function:  Symptomatic anemia.  Arterial hypotension      ===============================================================    Michael Mcintosh MD, MPH  Department of Hospital Medicine   [facility]  [phone] pg  (7pm - 6am)          This note is dictated using [employer] voice recognition software.  There are word recognition mistakes that are occasionally missed on review.

## 2020-10-13 NOTE — CONSULTS
[facility]  Hematology/Oncology  Consult Note    Patient Name: Colten Franco  MRN: [id number]  Admission Date: 10/12/2020  Hospital Length of Stay: 1 days  Code Status: Full Code   Attending Provider: Jaycee Joyner MD  Consulting Provider: Edgardo Alas MD  Primary Care Physician: Primary Doctor No  Principal Problem:Symptomatic anemia    Inpatient consult to Hematology/Oncology - [facility]  Consult performed by: Edgardo Alas MD  Consult ordered by: Michael Mcintosh MD  Reason for consult: Anemia        Subjective:     HPI:  Pt is a 75 y.o. male with neuropathy, DMII, prostate cancer who presented to the hospital after his screening labs showed a hemoglobin of 5.6g/dL.  Dr Trujillo in the ER was contacted about the lab result and called the patient to come to the ER for a blood transfusion.  The patient states he has been having black stools off/on for some time.  He denies any BRBPR.  He complains of chronic fatigue.  The patient denies CP, SOB, abdominal pain, N/V, constipation, diarrhea.  The patient denies fever, chills, night sweats, weight loss, new lumps or bumps, easy bruising or bleeding.    Oncology Treatment Plan:   [No treatment plan]    Medications:  Continuous Infusions:  Scheduled Meds:   dronabinoL  2.5 mg Oral BID AC    pantoprazole  40 mg Intravenous BID    pantoprazole  80 mg Intravenous Once    senna-docusate 8.6-50 mg  1 tablet Oral Daily    sodium chloride 0.9%  3 mL Intravenous Q8H     PRN Meds:sodium chloride, acetaminophen, bisacodyL, HYDROcodone-acetaminophen, influenza, melatonin, mineral oil, morphine, ondansetron, pneumoc 13-chelsy conj-dip cr(PF), polyethylene glycol, promethazine     Review of patient's allergies indicates:  No Known Allergies     Past Medical History:   Diagnosis Date    DMII (diabetes mellitus, type 2)     Neuropathy due to chemotherapeutic drug     Prostate cancer      Past Surgical History:   Procedure Laterality Date     PROSTATECTOMY       Family History     None        Tobacco Use    Smoking status: Current Every Day Smoker     Packs/day: 0.50     Years: 50.00     Pack years: 25.00     Types: Cigarettes    Smokeless tobacco: Never Used   Substance and Sexual Activity    Alcohol use: Yes     Comment: on occasion    Drug use: Never    Sexual activity: Not on file       Review of Systems   Constitutional: Positive for fatigue. Negative for chills and fever.   HENT: Negative for sore throat and trouble swallowing.    Eyes: Negative for photophobia and visual disturbance.   Respiratory: Negative for cough, chest tightness and shortness of breath.    Cardiovascular: Negative for chest pain, palpitations and leg swelling.   Gastrointestinal: Negative for abdominal pain, constipation, diarrhea, nausea and vomiting.        Melena   Endocrine: Negative for cold intolerance and heat intolerance.   Genitourinary: Negative for difficulty urinating, dysuria and hematuria.   Musculoskeletal: Negative for arthralgias, back pain and myalgias.   Skin: Negative for color change and rash.   Neurological: Negative for dizziness, light-headedness, numbness and headaches.   Psychiatric/Behavioral: Positive for sleep disturbance.     Objective:     Vital Signs (Most Recent):  Temp: 98.5 °F (36.9 °C) (10/13/20 1117)  Pulse: 80 (10/13/20 1117)  Resp: 17 (10/13/20 1117)  BP: 118/63 (10/13/20 1117)  SpO2: 95 % (10/13/20 1117) Vital Signs (24h Range):  Temp:  [97.5 °F (36.4 °C)-98.6 °F (37 °C)] 98.5 °F (36.9 °C)  Pulse:  [80-94] 80  Resp:  [16-22] 17  SpO2:  [92 %-100 %] 95 %  BP: ()/(50-73) 118/63     Weight: 60.7 kg (133 lb 13.1 oz)  Body mass index is 18.66 kg/m².  Body surface area is 1.74 meters squared.      Intake/Output Summary (Last 24 hours) at 10/13/2020 1411  Last data filed at 10/13/2020 1300  Gross per 24 hour   Intake 971.25 ml   Output --   Net 971.25 ml       Physical Exam  Constitutional:       General: He is not in acute  distress.     Appearance: He is well-developed. He is not diaphoretic.      Comments: Thin, cachectic   HENT:      Head: Normocephalic and atraumatic.   Eyes:      General: No scleral icterus.        Right eye: No discharge.         Left eye: No discharge.   Cardiovascular:      Rate and Rhythm: Normal rate and regular rhythm.      Heart sounds: Normal heart sounds. No murmur. No friction rub. No gallop.    Pulmonary:      Effort: Pulmonary effort is normal. No respiratory distress.      Breath sounds: Normal breath sounds. No wheezing or rales.   Chest:      Chest wall: No tenderness.   Abdominal:      General: Bowel sounds are normal. There is no distension.      Palpations: Abdomen is soft. There is no mass.      Tenderness: There is no abdominal tenderness. There is no rebound.   Musculoskeletal: Normal range of motion.         General: No tenderness.   Skin:     General: Skin is warm and dry.      Findings: No erythema or rash.   Neurological:      Mental Status: He is alert and oriented to person, place, and time.      Coordination: Coordination normal.   Psychiatric:         Behavior: Behavior normal.         Significant Labs:   Recent Results (from the past 24 hour(s))   CBC auto differential    Collection Time: 10/12/20  9:45 PM   Result Value Ref Range    WBC 5.32 3.90 - 12.70 K/uL    RBC 1.92 (L) 4.60 - 6.20 M/uL    Hemoglobin 5.3 (LL) 14.0 - 18.0 g/dL    Hematocrit 17.8 (LL) 40.0 - 54.0 %    Mean Corpuscular Volume 93 82 - 98 fL    Mean Corpuscular Hemoglobin 27.6 27.0 - 31.0 pg    Mean Corpuscular Hemoglobin Conc 29.8 (L) 32.0 - 36.0 g/dL    RDW 19.9 (H) 11.5 - 14.5 %    Platelets 199 150 - 350 K/uL    MPV 8.8 (L) 9.2 - 12.9 fL    Immature Granulocytes 2.1 (H) 0.0 - 0.5 %    Gran # (ANC) 3.7 1.8 - 7.7 K/uL    Immature Grans (Abs) 0.11 (H) 0.00 - 0.04 K/uL    Lymph # 1.0 1.0 - 4.8 K/uL    Mono # 0.5 0.3 - 1.0 K/uL    Eos # 0.1 0.0 - 0.5 K/uL    Baso # 0.01 0.00 - 0.20 K/uL    nRBC 1 (A) 0 /100 WBC     Gran% 69.5 38.0 - 73.0 %    Lymph% 18.2 18.0 - 48.0 %    Mono% 8.5 4.0 - 15.0 %    Eosinophil% 1.5 0.0 - 8.0 %    Basophil% 0.2 0.0 - 1.9 %    Differential Method Automated    Comprehensive metabolic panel    Collection Time: 10/12/20  9:45 PM   Result Value Ref Range    Sodium 143 136 - 145 mmol/L    Potassium 4.7 3.5 - 5.1 mmol/L    Chloride 107 95 - 110 mmol/L    CO2 26 23 - 29 mmol/L    Glucose 155 (H) 70 - 110 mg/dL    BUN, Bld 22 8 - 23 mg/dL    Creatinine 1.2 0.5 - 1.4 mg/dL    Calcium 8.4 (L) 8.7 - 10.5 mg/dL    Total Protein 6.2 6.0 - 8.4 g/dL    Albumin 2.3 (L) 3.5 - 5.2 g/dL    Total Bilirubin 0.2 0.1 - 1.0 mg/dL    Alkaline Phosphatase 231 (H) 55 - 135 U/L    AST 13 10 - 40 U/L    ALT 13 10 - 44 U/L    Anion Gap 10 8 - 16 mmol/L    eGFR if African American >60 >60 mL/min/1.73 m^2    eGFR if non African American 60 >60 mL/min/1.73 m^2   Type & Screen    Collection Time: 10/12/20  9:45 PM   Result Value Ref Range    Group & Rh O POS     Indirect Rita NEG    Reticulocytes    Collection Time: 10/12/20  9:45 PM   Result Value Ref Range    Retic 1.5 0.4 - 2.0 %   Folate    Collection Time: 10/12/20  9:45 PM   Result Value Ref Range    Folate 5.9 4.0 - 24.0 ng/mL   Vitamin B12    Collection Time: 10/12/20  9:45 PM   Result Value Ref Range    Vitamin B-12 438 210 - 950 pg/mL   Iron and TIBC    Collection Time: 10/12/20  9:45 PM   Result Value Ref Range    Iron 53 45 - 160 ug/dL    Transferrin 114 (L) 200 - 375 mg/dL    TIBC 169 (L) 250 - 450 ug/dL    Saturated Iron 31 20 - 50 %   Lactate dehydrogenase    Collection Time: 10/12/20  9:45 PM   Result Value Ref Range     110 - 260 U/L   TSH    Collection Time: 10/12/20  9:45 PM   Result Value Ref Range    TSH 2.032 0.400 - 4.000 uIU/mL   Haptoglobin    Collection Time: 10/12/20  9:45 PM   Result Value Ref Range    Haptoglobin 450 (H) 30 - 250 mg/dL   Prepare RBC 2 Units; hgb 5.6    Collection Time: 10/12/20  9:45 PM   Result Value Ref Range    UNIT NUMBER  [id number]     Product Code T1091S79     DISPENSE STATUS ISSUED     CODING SYSTEM WXRD165     Unit Blood Type Code 5100     Unit Blood Type O POS     Unit Expiration [id number]     UNIT NUMBER [id number]     Product Code X8980K61     DISPENSE STATUS ISSUED     CODING SYSTEM PDCG931     Unit Blood Type Code 5100     Unit Blood Type O POS     Unit Expiration [id number]    Protime-INR    Collection Time: 10/12/20 10:25 PM   Result Value Ref Range    Prothrombin Time 11.1 9.0 - 12.5 sec    INR 1.0 0.8 - 1.2   APTT    Collection Time: 10/12/20 10:25 PM   Result Value Ref Range    aPTT 34.3 (H) 21.0 - 32.0 sec   POCT COVID-19 Rapid Screening    Collection Time: 10/12/20 11:37 PM   Result Value Ref Range    POC Rapid COVID Negative Negative     Acceptable Yes    POCT glucose    Collection Time: 10/13/20  1:42 AM   Result Value Ref Range    POCT Glucose 142 (H) 70 - 110 mg/dL   POCT glucose    Collection Time: 10/13/20  7:38 AM   Result Value Ref Range    POCT Glucose 92 70 - 110 mg/dL   CBC auto differential    Collection Time: 10/13/20  9:32 AM   Result Value Ref Range    WBC 5.81 3.90 - 12.70 K/uL    RBC 2.57 (L) 4.60 - 6.20 M/uL    Hemoglobin 7.3 (L) 14.0 - 18.0 g/dL    Hematocrit 23.2 (L) 40.0 - 54.0 %    Mean Corpuscular Volume 90 82 - 98 fL    Mean Corpuscular Hemoglobin 28.4 27.0 - 31.0 pg    Mean Corpuscular Hemoglobin Conc 31.5 (L) 32.0 - 36.0 g/dL    RDW 17.8 (H) 11.5 - 14.5 %    Platelets 132 (L) 150 - 350 K/uL    MPV 8.7 (L) 9.2 - 12.9 fL    Immature Granulocytes 1.2 (H) 0.0 - 0.5 %    Gran # (ANC) 4.5 1.8 - 7.7 K/uL    Immature Grans (Abs) 0.07 (H) 0.00 - 0.04 K/uL    Lymph # 0.8 (L) 1.0 - 4.8 K/uL    Mono # 0.4 0.3 - 1.0 K/uL    Eos # 0.1 0.0 - 0.5 K/uL    Baso # 0.01 0.00 - 0.20 K/uL    nRBC 1 (A) 0 /100 WBC    Gran% 77.2 (H) 38.0 - 73.0 %    Lymph% 13.3 (L) 18.0 - 48.0 %    Mono% 6.9 4.0 - 15.0 %    Eosinophil% 1.2 0.0 - 8.0 %    Basophil% 0.2 0.0 - 1.9 %    Differential Method  Automated    Ferritin    Collection Time: 10/13/20  9:32 AM   Result Value Ref Range    Ferritin 2,555 (H) 20.0 - 300.0 ng/mL   POCT glucose    Collection Time: 10/13/20 11:19 AM   Result Value Ref Range    POCT Glucose 89 70 - 110 mg/dL     Diagnostic Results:  I have reviewed all pertinent imaging results/findings within the past 24 hours.    Assessment/Plan:     * Symptomatic anemia  -The patient was found to have hemoglobin of 5.6g/dL yesterday and is s/p 2 units of PRBC's  -The patient complains of intermittent episodes of black stools concerning for melena  -The patient is currently on protonix and GI has been consulted  -Will follow up GI recs.  -Iron studies are suggestive of anemia of chronic disease  -B12 and folate are normal and haptoglobin and LDH are not suggestive of a hemolytic anemia.  -Will monitor    Prostate cancer  -The patient has advanced prostate cancer and is s/p multiple lines of treatment including Lupron, palliative radiation, provenge (xofigo), zytiga (abiratoerone) and prednisone, enzalutamide in combination with [id number] (EZH2 inhibitor) as part of a phase ½ trial, AAA962 trial (start 3/03/20), and most recently casodex and Lupron   -Pt is to have outpatient scans: NM bone scan and CT C/A/P on 10/16/20  -PSA on 10/12/20 was markedly elevated at 927.4  -No need for inpatient treatment        Thank you for your consult. Hematology/Oncology service will follow-up with patient. Please contact us if you have any additional questions.       Edgardo Alas MD  Hematology/Oncology  [facility]

## 2020-10-13 NOTE — NURSING
Called er at this time to receive report. Nurse not available. Will wait on nurse to return phone call.

## 2020-10-13 NOTE — TELEPHONE ENCOUNTER
Informed by lab that patient had a critical hemoglobin of 5.6.  Attempt to call patient at number listed on file.  No answer.  However message left for patients who reports to emergency department.  Secure chat sent to Dr. Alas.

## 2020-10-13 NOTE — HPI
Pt is a 75 y.o. male with neuropathy, DMII, prostate cancer who presented to the hospital after his screening labs showed a hemoglobin of 5.6g/dL.  Dr Trujillo in the ER was contacted about the lab result and called the patient to come to the ER for a blood transfusion.  The patient states he has been having black stools off/on for some time.  He denies any BRBPR.  He complains of chronic fatigue.  The patient denies CP, SOB, abdominal pain, N/V, constipation, diarrhea.  The patient denies fever, chills, night sweats, weight loss, new lumps or bumps, easy bruising or bleeding.

## 2020-10-13 NOTE — PLAN OF CARE
Discharge planning and home needs addressed with patient and daughter, Kitty at bedside; patient confirmed information on face sheet as correct;     TN Role Explained.  Patient identified by using 2 identifiers:  Name and date of birth    Patient stated that he lives independently at home with spouse and Kitty who will provide help at home if needed; has cane but does not always use it;    [place] and request assistance to find a local PCP; prefers [facility]    Daughter stated that patient is scheduled to start OT in November; unsure of the name of the clinic        TN name and means of contact given to patient and encouraged to call for any discharge needs or questions       [id number] [place] [facility] - [address] AT [facility] & [address]  [place] [address]  Phone: [phone] Fax: [phone]    [place] [id number]  [place] - [address]  [address]  [place] [address]  Phone: [phone] Fax: [phone]         10/13/20 1251   Discharge Assessment   Assessment Type Discharge Planning Assessment   Confirmed/corrected address and phone number on facesheet? Yes   Assessment information obtained from? Patient   Expected Length of Stay (days) 3   Communicated expected length of stay with patient/caregiver yes   Prior to hospitilization cognitive status: Alert/Oriented   Prior to hospitalization functional status: Independent;Assistive Equipment   Current cognitive status: Alert/Oriented   Current Functional Status: Independent;Assistive Equipment   Lives With child(rick), adult   Able to Return to Prior Arrangements yes   Is patient able to care for self after discharge? Yes   Who are your caregiver(s) and their phone number(s)? Kitty Mcdowell (daughter) [phone]   Patient's perception of discharge disposition home or selfcare   Patient currently being followed by outpatient case  management? No   Patient currently receives any other outside agency services? Yes   Name and contact number of agency or person providing outside services pt to start Occupational Therapy in November   Equipment Currently Used at Home cane, straight   Do you have any problems affording any of your prescribed medications? No   Is the patient taking medications as prescribed? yes   Does the patient have transportation home? Yes   Transportation Anticipated family or friend will provide   Does the patient receive services at the Coumadin Clinic? No   Discharge Plan A Home   DME Needed Upon Discharge  none   Patient/Family in Agreement with Plan yes

## 2020-10-14 ENCOUNTER — ANESTHESIA EVENT (OUTPATIENT)
Dept: ENDOSCOPY | Facility: HOSPITAL | Age: 75
DRG: 723 | End: 2020-10-14
Payer: OTHER GOVERNMENT

## 2020-10-14 ENCOUNTER — ANESTHESIA (OUTPATIENT)
Dept: ENDOSCOPY | Facility: HOSPITAL | Age: 75
DRG: 723 | End: 2020-10-14
Payer: OTHER GOVERNMENT

## 2020-10-14 LAB
BASOPHILS # BLD AUTO: 0.01 K/UL (ref 0–0.2)
BASOPHILS # BLD AUTO: 0.02 K/UL (ref 0–0.2)
BASOPHILS NFR BLD: 0.2 % (ref 0–1.9)
BASOPHILS NFR BLD: 0.4 % (ref 0–1.9)
DIFFERENTIAL METHOD: ABNORMAL
DIFFERENTIAL METHOD: ABNORMAL
EOSINOPHIL # BLD AUTO: 0.1 K/UL (ref 0–0.5)
EOSINOPHIL # BLD AUTO: 0.1 K/UL (ref 0–0.5)
EOSINOPHIL NFR BLD: 1.7 % (ref 0–8)
EOSINOPHIL NFR BLD: 1.8 % (ref 0–8)
ERYTHROCYTE [DISTWIDTH] IN BLOOD BY AUTOMATED COUNT: 17.3 % (ref 11.5–14.5)
ERYTHROCYTE [DISTWIDTH] IN BLOOD BY AUTOMATED COUNT: 17.6 % (ref 11.5–14.5)
HCT VFR BLD AUTO: 23 % (ref 40–54)
HCT VFR BLD AUTO: 25.2 % (ref 40–54)
HGB BLD-MCNC: 7.2 G/DL (ref 14–18)
HGB BLD-MCNC: 8 G/DL (ref 14–18)
IMM GRANULOCYTES # BLD AUTO: 0.16 K/UL (ref 0–0.04)
IMM GRANULOCYTES # BLD AUTO: 0.21 K/UL (ref 0–0.04)
IMM GRANULOCYTES NFR BLD AUTO: 3.7 % (ref 0–0.5)
IMM GRANULOCYTES NFR BLD AUTO: 3.9 % (ref 0–0.5)
LYMPHOCYTES # BLD AUTO: 0.7 K/UL (ref 1–4.8)
LYMPHOCYTES # BLD AUTO: 0.7 K/UL (ref 1–4.8)
LYMPHOCYTES NFR BLD: 12.2 % (ref 18–48)
LYMPHOCYTES NFR BLD: 16.3 % (ref 18–48)
MCH RBC QN AUTO: 28.4 PG (ref 27–31)
MCH RBC QN AUTO: 28.6 PG (ref 27–31)
MCHC RBC AUTO-ENTMCNC: 31.3 G/DL (ref 32–36)
MCHC RBC AUTO-ENTMCNC: 31.7 G/DL (ref 32–36)
MCV RBC AUTO: 89 FL (ref 82–98)
MCV RBC AUTO: 91 FL (ref 82–98)
MONOCYTES # BLD AUTO: 0.4 K/UL (ref 0.3–1)
MONOCYTES # BLD AUTO: 0.5 K/UL (ref 0.3–1)
MONOCYTES NFR BLD: 8.9 % (ref 4–15)
MONOCYTES NFR BLD: 9.6 % (ref 4–15)
NEUTROPHILS # BLD AUTO: 2.8 K/UL (ref 1.8–7.7)
NEUTROPHILS # BLD AUTO: 4.1 K/UL (ref 1.8–7.7)
NEUTROPHILS NFR BLD: 69 % (ref 38–73)
NEUTROPHILS NFR BLD: 72.3 % (ref 38–73)
NRBC BLD-RTO: 1 /100 WBC
NRBC BLD-RTO: 2 /100 WBC
PLATELET # BLD AUTO: 147 K/UL (ref 150–350)
PLATELET # BLD AUTO: 150 K/UL (ref 150–350)
PMV BLD AUTO: 9 FL (ref 9.2–12.9)
PMV BLD AUTO: 9.5 FL (ref 9.2–12.9)
POCT GLUCOSE: 111 MG/DL (ref 70–110)
RBC # BLD AUTO: 2.52 M/UL (ref 4.6–6.2)
RBC # BLD AUTO: 2.82 M/UL (ref 4.6–6.2)
WBC # BLD AUTO: 4.06 K/UL (ref 3.9–12.7)
WBC # BLD AUTO: 5.64 K/UL (ref 3.9–12.7)

## 2020-10-14 PROCEDURE — 37000009 HC ANESTHESIA EA ADD 15 MINS: Performed by: INTERNAL MEDICINE

## 2020-10-14 PROCEDURE — 43235 EGD DIAGNOSTIC BRUSH WASH: CPT | Performed by: INTERNAL MEDICINE

## 2020-10-14 PROCEDURE — 37000008 HC ANESTHESIA 1ST 15 MINUTES: Performed by: INTERNAL MEDICINE

## 2020-10-14 PROCEDURE — D9220A PRA ANESTHESIA: ICD-10-PCS | Mod: ,,, | Performed by: ANESTHESIOLOGY

## 2020-10-14 PROCEDURE — D9220A PRA ANESTHESIA: Mod: ,,, | Performed by: ANESTHESIOLOGY

## 2020-10-14 PROCEDURE — 99232 SBSQ HOSP IP/OBS MODERATE 35: CPT | Mod: ,,, | Performed by: INTERNAL MEDICINE

## 2020-10-14 PROCEDURE — C9113 INJ PANTOPRAZOLE SODIUM, VIA: HCPCS | Performed by: INTERNAL MEDICINE

## 2020-10-14 PROCEDURE — A4216 STERILE WATER/SALINE, 10 ML: HCPCS | Performed by: HOSPITALIST

## 2020-10-14 PROCEDURE — 25000003 PHARM REV CODE 250: Performed by: HOSPITALIST

## 2020-10-14 PROCEDURE — 36415 COLL VENOUS BLD VENIPUNCTURE: CPT

## 2020-10-14 PROCEDURE — 43235 EGD DIAGNOSTIC BRUSH WASH: CPT | Mod: ,,, | Performed by: INTERNAL MEDICINE

## 2020-10-14 PROCEDURE — 43235 PR EGD, FLEX, DIAGNOSTIC: ICD-10-PCS | Mod: ,,, | Performed by: INTERNAL MEDICINE

## 2020-10-14 PROCEDURE — 25000003 PHARM REV CODE 250: Performed by: INTERNAL MEDICINE

## 2020-10-14 PROCEDURE — 63600175 PHARM REV CODE 636 W HCPCS: Performed by: STUDENT IN AN ORGANIZED HEALTH CARE EDUCATION/TRAINING PROGRAM

## 2020-10-14 PROCEDURE — 11000001 HC ACUTE MED/SURG PRIVATE ROOM

## 2020-10-14 PROCEDURE — 63600175 PHARM REV CODE 636 W HCPCS: Performed by: HOSPITALIST

## 2020-10-14 PROCEDURE — 99232 PR SUBSEQUENT HOSPITAL CARE,LEVL II: ICD-10-PCS | Mod: ,,, | Performed by: INTERNAL MEDICINE

## 2020-10-14 PROCEDURE — 85025 COMPLETE CBC W/AUTO DIFF WBC: CPT

## 2020-10-14 PROCEDURE — 25000003 PHARM REV CODE 250: Performed by: STUDENT IN AN ORGANIZED HEALTH CARE EDUCATION/TRAINING PROGRAM

## 2020-10-14 PROCEDURE — 63600175 PHARM REV CODE 636 W HCPCS: Performed by: INTERNAL MEDICINE

## 2020-10-14 RX ORDER — IBUPROFEN 200 MG
24 TABLET ORAL
Status: DISCONTINUED | OUTPATIENT
Start: 2020-10-14 | End: 2020-10-15 | Stop reason: HOSPADM

## 2020-10-14 RX ORDER — LIDOCAINE HYDROCHLORIDE 20 MG/ML
INJECTION, SOLUTION EPIDURAL; INFILTRATION; INTRACAUDAL; PERINEURAL
Status: DISPENSED
Start: 2020-10-14 | End: 2020-10-14

## 2020-10-14 RX ORDER — INSULIN ASPART 100 [IU]/ML
0-5 INJECTION, SOLUTION INTRAVENOUS; SUBCUTANEOUS
Status: DISCONTINUED | OUTPATIENT
Start: 2020-10-14 | End: 2020-10-15 | Stop reason: HOSPADM

## 2020-10-14 RX ORDER — PROPOFOL 10 MG/ML
INJECTION, EMULSION INTRAVENOUS
Status: DISPENSED
Start: 2020-10-14 | End: 2020-10-14

## 2020-10-14 RX ORDER — LIDOCAINE HYDROCHLORIDE 20 MG/ML
INJECTION INTRAVENOUS
Status: DISCONTINUED | OUTPATIENT
Start: 2020-10-14 | End: 2020-10-14

## 2020-10-14 RX ORDER — PROPOFOL 10 MG/ML
VIAL (ML) INTRAVENOUS
Status: DISCONTINUED | OUTPATIENT
Start: 2020-10-14 | End: 2020-10-14

## 2020-10-14 RX ORDER — IBUPROFEN 200 MG
16 TABLET ORAL
Status: DISCONTINUED | OUTPATIENT
Start: 2020-10-14 | End: 2020-10-15 | Stop reason: HOSPADM

## 2020-10-14 RX ORDER — SODIUM CHLORIDE 9 MG/ML
INJECTION, SOLUTION INTRAVENOUS CONTINUOUS
Status: DISCONTINUED | OUTPATIENT
Start: 2020-10-14 | End: 2020-10-14

## 2020-10-14 RX ORDER — SODIUM CHLORIDE 9 MG/ML
INJECTION, SOLUTION INTRAVENOUS CONTINUOUS PRN
Status: DISCONTINUED | OUTPATIENT
Start: 2020-10-14 | End: 2020-10-14

## 2020-10-14 RX ORDER — PREGABALIN 25 MG/1
25 CAPSULE ORAL 2 TIMES DAILY
Status: DISCONTINUED | OUTPATIENT
Start: 2020-10-14 | End: 2020-10-15 | Stop reason: HOSPADM

## 2020-10-14 RX ORDER — PANTOPRAZOLE SODIUM 40 MG/1
40 TABLET, DELAYED RELEASE ORAL DAILY
Status: DISCONTINUED | OUTPATIENT
Start: 2020-10-15 | End: 2020-10-15 | Stop reason: HOSPADM

## 2020-10-14 RX ORDER — GLUCAGON 1 MG
1 KIT INJECTION
Status: DISCONTINUED | OUTPATIENT
Start: 2020-10-14 | End: 2020-10-15 | Stop reason: HOSPADM

## 2020-10-14 RX ADMIN — PROPOFOL 80 MG: 10 INJECTION, EMULSION INTRAVENOUS at 10:10

## 2020-10-14 RX ADMIN — PREGABALIN 25 MG: 25 CAPSULE ORAL at 09:10

## 2020-10-14 RX ADMIN — Medication 3 ML: at 08:10

## 2020-10-14 RX ADMIN — SODIUM CHLORIDE: 9 INJECTION, SOLUTION INTRAVENOUS at 10:10

## 2020-10-14 RX ADMIN — Medication 9 MG: at 12:10

## 2020-10-14 RX ADMIN — HYDROCODONE BITARTRATE AND ACETAMINOPHEN 1 TABLET: 5; 325 TABLET ORAL at 12:10

## 2020-10-14 RX ADMIN — PANTOPRAZOLE SODIUM 40 MG: 40 INJECTION, POWDER, FOR SOLUTION INTRAVENOUS at 08:10

## 2020-10-14 RX ADMIN — Medication 100 MG: at 10:10

## 2020-10-14 RX ADMIN — Medication 3 ML: at 10:10

## 2020-10-14 RX ADMIN — DRONABINOL 2.5 MG: 2.5 CAPSULE ORAL at 06:10

## 2020-10-14 RX ADMIN — PREGABALIN 25 MG: 25 CAPSULE ORAL at 01:10

## 2020-10-14 RX ADMIN — PROPOFOL 20 MG: 10 INJECTION, EMULSION INTRAVENOUS at 10:10

## 2020-10-14 RX ADMIN — Medication 3 ML: at 01:10

## 2020-10-14 NOTE — TRANSFER OF CARE
"Anesthesia Transfer of Care Note    Patient: Colten Franco    Procedure(s) Performed: Procedure(s) (LRB):  EGD (ESOPHAGOGASTRODUODENOSCOPY) (N/A)    Patient location: GI    Anesthesia Type: general    Transport from OR: Transported from OR on room air with adequate spontaneous ventilation    Post pain: adequate analgesia    Post assessment: no apparent anesthetic complications and tolerated procedure well    Post vital signs: stable    Level of consciousness: awake and alert    Nausea/Vomiting: no nausea/vomiting    Complications: none    Transfer of care protocol was followed      Last vitals:   Visit Vitals  BP (!) 95/55   Pulse 85   Temp 36.4 °C (97.5 °F) (Oral)   Resp 20   Ht 5' 11" (1.803 m)   Wt 60.7 kg (133 lb 13.1 oz)   SpO2 97%   BMI 18.66 kg/m²     "

## 2020-10-14 NOTE — NURSING
Report received from FIDEL Ho. Patient resting comfortably in bed, no complaints, no acute distress noted. SCDs in use. Will continue to monitor.

## 2020-10-14 NOTE — ASSESSMENT & PLAN NOTE
Per patient's report, it is concerning to me that his presenting anemia is due to gastrointestinal bleed (likely slow bleed)   Agree with hematology that he likely has anemia of chronic disease, has underlying prostate cancer and unlikely lysing   I will review chart again to see what and when was his last treatment for cancer and if agent associated with anemia  Upper endoscopy did not reveal source for bleeding. Agree with changing pantoprazole to 40 mg daily  Is not excited about pursuing colonoscopy but is thinking about it. I will have to repeat CBC today and tomorrow morning to ensure he does not require another transfusion as technically his Hgb is still pretty low although stable post transfusion up to this AM's labs. Discussed with patient and wife at bedside who agree with further monitoring.

## 2020-10-14 NOTE — PLAN OF CARE
Problem: Fall Injury Risk  Goal: Absence of Fall and Fall-Related Injury  Outcome: Ongoing, Progressing  Intervention: Identify and Manage Contributors to Fall Injury Risk  Flowsheets (Taken 10/14/2020 1839)  Self-Care Promotion:   independence encouraged   BADL personal routines maintained   safe use of adaptive equipment encouraged  Medication Review/Management: medications reviewed  Intervention: Promote Injury-Free Environment  Flowsheets (Taken 10/14/2020 1839)  Safety Promotion/Fall Prevention:   assistive device/personal item within reach   bed alarm set   commode/urinal/bedpan at bedside   Fall Risk reviewed with patient/family   lighting adjusted   medications reviewed   nonskid shoes/socks when out of bed   room near unit station   side rails raised x 2   instructed to call staff for mobility  Environmental Safety Modification:   assistive device/personal items within reach   lighting adjusted   room organization consistent   clutter free environment maintained   room near unit station

## 2020-10-14 NOTE — NURSING
Patient returned to unit via bed. No complaints, no acute distress noted. Will continue to monitor.

## 2020-10-14 NOTE — ASSESSMENT & PLAN NOTE
-The patient has advanced prostate cancer and is s/p multiple lines of treatment including Lupron, palliative radiation, provenge (xofigo), zytiga (abiratoerone) and prednisone, enzalutamide in combination with [id number] (EZH2 inhibitor) as part of a phase ½ trial, [id number] trial (start 3/03/20), and most recently casodex and Lupron   -Pt is to have outpatient scans: NM bone scan and CT C/A/P on 10/16/20  -No need for inpatient treatment

## 2020-10-14 NOTE — ANESTHESIA PREPROCEDURE EVALUATION
10/14/2020  Colten Franco is a 75 y.o., male.    Anesthesia Evaluation     I have reviewed the Nursing Notes.       Review of Systems  Anesthesia Hx:  No problems with previous Anesthesia    Social:  Smoker   Hematology/Oncology:         -- Anemia: Hematology Comments: S/p transfusion of 2 units, now 7.3 Current/Recent Cancer. chemotherapy  Oncology Comments: Prostate ca     Cardiovascular:  Cardiovascular Normal     Pulmonary:  Pulmonary Normal    Renal/:  Renal/ Normal     Hepatic/GI:   No Bowel Prep. GERD Denies Liver Disease. Denies Hepatitis. Anemia with infrequent melena, no vomiting   Neurological:  Neurology Normal    Endocrine:   Diabetes, type 2 Denies Hypothyroidism. Denies Hyperthyroidism.        Physical Exam  General:  Cachexia    Airway/Jaw/Neck:   TOP FRONT TEETH ARE A PERMANENT BRIDGE  m4  SMALL MOUTH OPENING  FROM     Chest/Lungs:  Chest/Lungs Clear    Heart/Vascular:  Heart Findings: Normal       Mental Status:  Mental Status Findings: Normal        Anesthesia Plan  Type of Anesthesia, risks & benefits discussed:  Anesthesia Type:  general  Patient's Preference:   Intra-op Monitoring Plan: standard ASA monitors  Intra-op Monitoring Plan Comments:   Post Op Pain Control Plan:   Post Op Pain Control Plan Comments:   Induction:   IV  Beta Blocker:  Patient is not currently on a Beta-Blocker (No further documentation required).       Informed Consent: Patient understands risks and agrees with Anesthesia plan.  Questions answered. Anesthesia consent signed with patient.  ASA Score: 3     Day of Surgery Review of History & Physical:  There are no significant changes.  H&P update referred to the provider.         Ready For Surgery From Anesthesia Perspective.

## 2020-10-14 NOTE — PROGRESS NOTES
[facility]  Hematology/Oncology  Progress Note    Patient Name: Colten Franco  Admission Date: 10/12/2020  Hospital Length of Stay: 2 days  Code Status: Full Code     Subjective:     HPI:  Pt is a 75 y.o. male with neuropathy, DMII, prostate cancer who presented to the hospital after his screening labs showed a hemoglobin of 5.6g/dL.  Dr Trujillo in the ER was contacted about the lab result and called the patient to come to the ER for a blood transfusion.  The patient states he has been having black stools off/on for some time.  He denies any BRBPR.  He complains of chronic fatigue.  The patient denies CP, SOB, abdominal pain, N/V, constipation, diarrhea.  The patient denies fever, chills, night sweats, weight loss, new lumps or bumps, easy bruising or bleeding.    Interval History: The patient underwent an EGD today showing no signs of bleeding.  The patient denies any BRBPR, melena.  The patient denies CP, SOB, abdominal pain, N/V, constipation, diarrhea.  He did complain of difficulty sleeping.    Oncology Treatment Plan:   [No treatment plan]    Medications:  Continuous Infusions:  Scheduled Meds:   dronabinoL  2.5 mg Oral BID AC    lidocaine (PF) 20 mg/mL (2%)        [START ON 10/15/2020] pantoprazole  40 mg Oral Daily    pregabalin  25 mg Oral BID    propofoL        senna-docusate 8.6-50 mg  1 tablet Oral Daily    sodium chloride 0.9%  3 mL Intravenous Q8H     PRN Meds:sodium chloride, acetaminophen, bisacodyL, dextrose 50%, dextrose 50%, glucagon (human recombinant), glucose, glucose, HYDROcodone-acetaminophen, influenza, insulin aspart U-100, melatonin, mineral oil, morphine, ondansetron, pneumoc 13-chelsy conj-dip cr(PF), polyethylene glycol, promethazine     Review of Systems   Constitutional: Negative for chills and fever.   HENT: Negative for sore throat and trouble swallowing.    Respiratory: Negative for cough and shortness of breath.    Cardiovascular: Negative for chest  pain and palpitations.   Gastrointestinal: Negative for abdominal pain, anal bleeding, blood in stool, constipation, diarrhea, nausea and vomiting.   Skin: Negative for color change and rash.   Neurological: Negative for headaches.   Psychiatric/Behavioral: Positive for sleep disturbance.     Objective:     Vital Signs (Most Recent):  Temp: 98.8 °F (37.1 °C) (10/14/20 1633)  Pulse: 92 (10/14/20 1633)  Resp: 18 (10/14/20 1633)  BP: 133/64 (10/14/20 1633)  SpO2: (!) 93 % (10/14/20 1633) Vital Signs (24h Range):  Temp:  [97.5 °F (36.4 °C)-98.9 °F (37.2 °C)] 98.8 °F (37.1 °C)  Pulse:  [74-93] 92  Resp:  [18-20] 18  SpO2:  [93 %-99 %] 93 %  BP: ()/(54-64) 133/64     Weight: 60.7 kg (133 lb 13.1 oz)  Body mass index is 18.66 kg/m².  Body surface area is 1.74 meters squared.      Intake/Output Summary (Last 24 hours) at 10/14/2020 1851  Last data filed at 10/14/2020 1700  Gross per 24 hour   Intake 343 ml   Output --   Net 343 ml       Physical Exam  Constitutional:       General: He is not in acute distress.     Appearance: He is well-developed. He is not diaphoretic.      Comments: Thin, cachectic   HENT:      Head: Normocephalic and atraumatic.   Cardiovascular:      Rate and Rhythm: Normal rate and regular rhythm.      Heart sounds: Normal heart sounds. No murmur. No friction rub. No gallop.    Pulmonary:      Effort: Pulmonary effort is normal. No respiratory distress.      Breath sounds: Normal breath sounds. No wheezing or rales.   Chest:      Chest wall: No tenderness.   Abdominal:      General: Bowel sounds are normal. There is no distension.      Palpations: Abdomen is soft.      Tenderness: There is no abdominal tenderness.   Neurological:      Mental Status: He is alert and oriented to person, place, and time.         Significant Labs:   Recent Results (from the past 24 hour(s))   CBC auto differential    Collection Time: 10/14/20  4:40 AM   Result Value Ref Range    WBC 4.06 3.90 - 12.70 K/uL    RBC 2.52  (L) 4.60 - 6.20 M/uL    Hemoglobin 7.2 (L) 14.0 - 18.0 g/dL    Hematocrit 23.0 (L) 40.0 - 54.0 %    Mean Corpuscular Volume 91 82 - 98 fL    Mean Corpuscular Hemoglobin 28.6 27.0 - 31.0 pg    Mean Corpuscular Hemoglobin Conc 31.3 (L) 32.0 - 36.0 g/dL    RDW 17.6 (H) 11.5 - 14.5 %    Platelets 150 150 - 350 K/uL    MPV 9.5 9.2 - 12.9 fL    Immature Granulocytes 3.9 (H) 0.0 - 0.5 %    Gran # (ANC) 2.8 1.8 - 7.7 K/uL    Immature Grans (Abs) 0.16 (H) 0.00 - 0.04 K/uL    Lymph # 0.7 (L) 1.0 - 4.8 K/uL    Mono # 0.4 0.3 - 1.0 K/uL    Eos # 0.1 0.0 - 0.5 K/uL    Baso # 0.01 0.00 - 0.20 K/uL    nRBC 2 (A) 0 /100 WBC    Gran% 69.0 38.0 - 73.0 %    Lymph% 16.3 (L) 18.0 - 48.0 %    Mono% 8.9 4.0 - 15.0 %    Eosinophil% 1.7 0.0 - 8.0 %    Basophil% 0.2 0.0 - 1.9 %    Differential Method Automated          Diagnostic Results:  I have reviewed all pertinent imaging results/findings within the past 24 hours.    Assessment/Plan:     * Symptomatic anemia  -The patient was found to have hemoglobin of 5.6g/dL on admission  and is s/p 2 units of PRBC's  -Hemoglobin today was 7.2g/dL  -EGD today showed no abnormalities  -Gi following  -Iron studies are suggestive of anemia of chronic disease  -B12 and folate are normal and haptoglobin and LDH are not suggestive of a hemolytic anemia.  -Transfuse to keep hemoglobin above 7g/dL  -Anemia could be due to BM infiltration from underlying prostate cancer  -Will monitor    Prostate cancer  -The patient has advanced prostate cancer and is s/p multiple lines of treatment including Lupron, palliative radiation, provenge (xofigo), zytiga (abiratoerone) and prednisone, enzalutamide in combination with [id number] (EZH2 inhibitor) as part of a phase ½ trial, [id number] trial (start 3/03/20), and most recently casodex and Lupron   -Pt is to have outpatient scans: NM bone scan and CT C/A/P on 10/16/20  -No need for inpatient treatment    Other insomnia  -Pt with difficulty sleeping  -Pt started on  melatonin  -Primary team managing    Hematology/Oncology service will follow-up with patient. Please contact us if you have any additional questions.       Edgardo Alas MD  Hematology/Oncology  [facility]

## 2020-10-14 NOTE — [employer] PATIENT INSTRUCTIONS
Discharge Summary/Instructions after an Endoscopic Procedure  Patient Name: Colten Franco  Patient MRN: [id number]  Patient YOB: 1945  Wednesday, October 14, 2020  Silviano Martinez MD  RESTRICTIONS:  During your procedure today, you received medications for sedation.  These   medications may affect your judgment, balance and coordination.  Therefore,   for 24 hours, you have the following restrictions:   - DO NOT drive a car, operate machinery, make legal/financial decisions,   sign important papers or drink alcohol.    ACTIVITY:  Today: no heavy lifting, straining or running due to procedural   sedation/anesthesia.  The following day: return to full activity including work.  DIET:  Eat and drink normally unless instructed otherwise.     TREATMENT FOR COMMON SIDE EFFECTS:  - Mild abdominal pain, nausea, belching, bloating or excessive gas:  rest,   eat lightly and use a heating pad.  - Sore Throat: treat with throat lozenges and/or gargle with warm salt   water.  - Because air was used during the procedure, expelling large amounts of air   from your rectum or belching is normal.  - If a bowel prep was taken, you may not have a bowel movement for 1-3 days.    This is normal.  SYMPTOMS TO WATCH FOR AND REPORT TO YOUR PHYSICIAN:  1. Abdominal pain or bloating, other than gas cramps.  2. Chest pain.  3. Back pain.  4. Signs of infection such as: chills or fever occurring within 24 hours   after the procedure.  5. Rectal bleeding, which would show as bright red, maroon, or black stools.   (A tablespoon of blood from the rectum is not serious, especially if   hemorrhoids are present.)  6. Vomiting.  7. Weakness or dizziness.  GO DIRECTLY TO THE NEAREST EMERGENCY ROOM IF YOU HAVE ANY OF THE FOLLOWING:      Difficulty breathing              Chills and/or fever over 101 F   Persistent vomiting and/or vomiting blood   Severe abdominal pain   Severe chest pain   Black, tarry stools   Bleeding- more than one  tablespoon   Any other symptom or condition that you feel may need urgent attention  Your doctor recommends these additional instructions:  If any biopsies were taken, your doctors clinic will contact you in 1 to 2   weeks with any results.  - Return patient to hospital [facility] for ongoing care.   - Advance diet as tolerated.   - Continue present medications.  For questions, problems or results please call your physician - Silviano Martinez MD at Work:  [phone].  [facility] can be reached at [phone]     IF A COMPLICATION OR EMERGENCY SITUATION ARISES AND YOU ARE UNABLE TO REACH   YOUR PHYSICIAN - GO DIRECTLY TO THE EMERGENCY ROOM.  Silviano Martinez MD  10/14/2020 10:52:33 AM  This report has been verified and signed electronically.  [employer]

## 2020-10-14 NOTE — ASSESSMENT & PLAN NOTE
-The patient was found to have hemoglobin of 5.6g/dL on admission  and is s/p 2 units of PRBC's  -Hemoglobin today was 7.2g/dL  -EGD today showed no abnormalities  -Gi following  -Iron studies are suggestive of anemia of chronic disease  -B12 and folate are normal and haptoglobin and LDH are not suggestive of a hemolytic anemia.  -Transfuse to keep hemoglobin above 7g/dL  -Anemia could be due to BM infiltration from underlying prostate cancer  -Will monitor

## 2020-10-14 NOTE — SUBJECTIVE & OBJECTIVE
"Interval History: states feeling well just tired. Denied fatigue, shortness of breath, dizziness. Hgb low but stable. Stated having a "dark" BM today. EGD showed no potential source for bleeding. States "thinking about" doing colonoscopy or not but as an outpatient.     Review of Systems   Constitutional: Negative.    Respiratory: Negative.    Cardiovascular: Negative.    Gastrointestinal:        Dark stool     Objective:     Vital Signs (Most Recent):  Temp: 98.2 °F (36.8 °C) (10/14/20 1145)  Pulse: 83 (10/14/20 1145)  Resp: 18 (10/14/20 1145)  BP: 129/64 (10/14/20 1145)  SpO2: (!) 94 % (10/14/20 1145) Vital Signs (24h Range):  Temp:  [97.5 °F (36.4 °C)-98.9 °F (37.2 °C)] 98.2 °F (36.8 °C)  Pulse:  [74-93] 83  Resp:  [18-20] 18  SpO2:  [94 %-99 %] 94 %  BP: ()/(54-64) 129/64     Weight: 60.7 kg (133 lb 13.1 oz)  Body mass index is 18.66 kg/m².    Intake/Output Summary (Last 24 hours) at 10/14/2020 1521  Last data filed at 10/14/2020 1300  Gross per 24 hour   Intake 223 ml   Output --   Net 223 ml      Physical Exam  Vitals signs and nursing note reviewed.   Constitutional:       General: He is not in acute distress.     Appearance: He is not toxic-appearing.   Cardiovascular:      Rate and Rhythm: Normal rate and regular rhythm.   Pulmonary:      Effort: Pulmonary effort is normal.      Breath sounds: Normal breath sounds.   Abdominal:      General: Abdomen is flat. Bowel sounds are normal. There is no distension.      Palpations: Abdomen is soft.      Tenderness: There is no abdominal tenderness.   Skin:     General: Skin is warm and dry.      Capillary Refill: Capillary refill takes less than 2 seconds.   Neurological:      Mental Status: He is alert and oriented to person, place, and time.   Psychiatric:         Mood and Affect: Mood normal.         Behavior: Behavior normal.         Thought Content: Thought content normal.         Judgment: Judgment normal.         Significant Labs: All pertinent labs " within the past 24 hours have been reviewed.    Significant Imaging: I have reviewed all pertinent imaging results/findings within the past 24 hours.  I have reviewed and interpreted all pertinent imaging results/findings within the past 24 hours.

## 2020-10-14 NOTE — NURSING
Patient transported to EGD via wheelchair. No complaints, no acute distress noted. Will monitor upon return to unit.

## 2020-10-14 NOTE — NURSING
Care taken over by FIDEL Ho from ALEJANDRA Morris. Patient is in bed resting, requested water and jello before being NPO at midnight.

## 2020-10-14 NOTE — PROGRESS NOTES
[facility] Hospital Medicine  Progress Note    Patient Name: Colten Franco  MRN: [id number]  Patient Class: IP- Inpatient   Admission Date: 10/12/2020  Length of Stay: 2 days  Attending Physician: Jaycee Joyner MD  Primary Care Provider: Rosalio Azar MD        Subjective:     Principal Problem:Symptomatic anemia        HPI:  Colten Franco is a 75 y.o. male that (in part)  has a past medical history of DMII (diabetes mellitus, type 2), Neuropathy due to chemotherapeutic drug, and Prostate cancer.  has a past surgical history that includes Prostatectomy. Presents to [facility] Emergency Department after receiving a phone call from his primary care physician who stated that the patient had abnormal lab results.  He was anemic.  His hemoglobin was 5.6.  Patient complains of generalized weakness, fatigue, shortness of breath at rest, and dyspnea with exertion.  Some dizziness when he stands too quickly.  Denies hemoptysis, hematemesis, melena, hematochezia, or hematuria.  No other obvious blood loss.  History of prostate cancer and has received multiple rounds of chemotherapy as well as palliative radiation.  He is being followed by Hematology-Oncology as an outpatient.  He was recently seen by Dr. CADEN Alas.      In the emergency department routine laboratory studies confirmed normocytic anemia with a hemoglobin of 5.3.  No other significant lab abnormalities noted other than decreased TIBC and transferrin.  2 units of PRBCs were ordered for transfusion.  Will check after 2nd unit.  Patient understands he may need additional blood after that.  Recommendations given by Hematology-Oncology for further anemia workup.     Hospital medicine has been asked to admit to inpatient for further evaluation and treatment.     Overview/Hospital Course:  No notes on file    Interval History: states feeling well just tired. Denied fatigue, shortness of breath, dizziness. Hgb  "low but stable. Stated having a "dark" BM today. EGD showed no potential source for bleeding. States "thinking about" doing colonoscopy or not but as an outpatient.     Review of Systems   Constitutional: Negative.    Respiratory: Negative.    Cardiovascular: Negative.    Gastrointestinal:        Dark stool     Objective:     Vital Signs (Most Recent):  Temp: 98.2 °F (36.8 °C) (10/14/20 1145)  Pulse: 83 (10/14/20 1145)  Resp: 18 (10/14/20 1145)  BP: 129/64 (10/14/20 1145)  SpO2: (!) 94 % (10/14/20 1145) Vital Signs (24h Range):  Temp:  [97.5 °F (36.4 °C)-98.9 °F (37.2 °C)] 98.2 °F (36.8 °C)  Pulse:  [74-93] 83  Resp:  [18-20] 18  SpO2:  [94 %-99 %] 94 %  BP: ()/(54-64) 129/64     Weight: 60.7 kg (133 lb 13.1 oz)  Body mass index is 18.66 kg/m².    Intake/Output Summary (Last 24 hours) at 10/14/2020 1521  Last data filed at 10/14/2020 1300  Gross per 24 hour   Intake 223 ml   Output --   Net 223 ml      Physical Exam  Vitals signs and nursing note reviewed.   Constitutional:       General: He is not in acute distress.     Appearance: He is not toxic-appearing.   Cardiovascular:      Rate and Rhythm: Normal rate and regular rhythm.   Pulmonary:      Effort: Pulmonary effort is normal.      Breath sounds: Normal breath sounds.   Abdominal:      General: Abdomen is flat. Bowel sounds are normal. There is no distension.      Palpations: Abdomen is soft.      Tenderness: There is no abdominal tenderness.   Skin:     General: Skin is warm and dry.      Capillary Refill: Capillary refill takes less than 2 seconds.   Neurological:      Mental Status: He is alert and oriented to person, place, and time.   Psychiatric:         Mood and Affect: Mood normal.         Behavior: Behavior normal.         Thought Content: Thought content normal.         Judgment: Judgment normal.         Significant Labs: All pertinent labs within the past 24 hours have been reviewed.    Significant Imaging: I have reviewed all pertinent " imaging results/findings within the past 24 hours.  I have reviewed and interpreted all pertinent imaging results/findings within the past 24 hours.      Assessment/Plan:      * Symptomatic anemia  Per patient's report, it is concerning to me that his presenting anemia is due to gastrointestinal bleed (likely slow bleed)   Agree with hematology that he likely has anemia of chronic disease, has underlying prostate cancer and unlikely lysing   I will review chart again to see what and when was his last treatment for cancer and if agent associated with anemia  Upper endoscopy did not reveal source for bleeding. Agree with changing pantoprazole to 40 mg daily  Is not excited about pursuing colonoscopy but is thinking about it. I will have to repeat CBC today and tomorrow morning to ensure he does not require another transfusion as technically his Hgb is still pretty low although stable post transfusion up to this AM's labs. Discussed with patient and wife at bedside who agree with further monitoring.       Tobacco abuse  Will ask patient if still smoking      Prostate cancer  Noted  Oncology on board. Follow up with Dr Alas as outpatient      DMII (diabetes mellitus, type 2)  On metformin at home. Hold while inpatient. Add glucose checks and SSI to be adjusted if needed for goal glucose 140-180  Hemoglobin A1c pending        VTE Risk Mitigation (From admission, onward)         Ordered     IP VTE HIGH RISK PATIENT  Once      10/13/20 0212     Place TRUNG hose  Until discontinued      10/13/20 0212     Place sequential compression device  Until discontinued      10/13/20 0212                Discharge Planning   CAR:      Code Status: Full Code   Is the patient medically ready for discharge?:     Reason for patient still in hospital (select all that apply): Treatment  Discharge Plan A: Home                  Jaycee Chacon MD  Department of Hospital Medicine   [facility]

## 2020-10-14 NOTE — PLAN OF CARE
Per discharge assessment interview, patient and daughter requested assistance in finding a PCP; stated would like to have an [facility] at the [place] [facility]; [place] called and was told that Dr Azar is accepting new patients and accepts patient insurance; chart updated to list Dr Azar as his PCP    [place] contacted daughter, in room and notified; is in agreement

## 2020-10-14 NOTE — SUBJECTIVE & OBJECTIVE
Interval History: The patient underwent an EGD today showing no signs of bleeding.  The patient denies any BRBPR, melena.  The patient denies CP, SOB, abdominal pain, N/V, constipation, diarrhea.  He did complain of difficulty sleeping.    Oncology Treatment Plan:   [No treatment plan]    Medications:  Continuous Infusions:  Scheduled Meds:   dronabinoL  2.5 mg Oral BID AC    lidocaine (PF) 20 mg/mL (2%)        [START ON 10/15/2020] pantoprazole  40 mg Oral Daily    pregabalin  25 mg Oral BID    propofoL        senna-docusate 8.6-50 mg  1 tablet Oral Daily    sodium chloride 0.9%  3 mL Intravenous Q8H     PRN Meds:sodium chloride, acetaminophen, bisacodyL, dextrose 50%, dextrose 50%, glucagon (human recombinant), glucose, glucose, HYDROcodone-acetaminophen, influenza, insulin aspart U-100, melatonin, mineral oil, morphine, ondansetron, pneumoc 13-chelsy conj-dip cr(PF), polyethylene glycol, promethazine     Review of Systems   Constitutional: Negative for chills and fever.   HENT: Negative for sore throat and trouble swallowing.    Respiratory: Negative for cough and shortness of breath.    Cardiovascular: Negative for chest pain and palpitations.   Gastrointestinal: Negative for abdominal pain, anal bleeding, blood in stool, constipation, diarrhea, nausea and vomiting.   Skin: Negative for color change and rash.   Neurological: Negative for headaches.   Psychiatric/Behavioral: Positive for sleep disturbance.     Objective:     Vital Signs (Most Recent):  Temp: 98.8 °F (37.1 °C) (10/14/20 1633)  Pulse: 92 (10/14/20 1633)  Resp: 18 (10/14/20 1633)  BP: 133/64 (10/14/20 1633)  SpO2: (!) 93 % (10/14/20 1633) Vital Signs (24h Range):  Temp:  [97.5 °F (36.4 °C)-98.9 °F (37.2 °C)] 98.8 °F (37.1 °C)  Pulse:  [74-93] 92  Resp:  [18-20] 18  SpO2:  [93 %-99 %] 93 %  BP: ()/(54-64) 133/64     Weight: 60.7 kg (133 lb 13.1 oz)  Body mass index is 18.66 kg/m².  Body surface area is 1.74 meters  squared.      Intake/Output Summary (Last 24 hours) at 10/14/2020 1851  Last data filed at 10/14/2020 1700  Gross per 24 hour   Intake 343 ml   Output --   Net 343 ml       Physical Exam  Constitutional:       General: He is not in acute distress.     Appearance: He is well-developed. He is not diaphoretic.      Comments: Thin, cachectic   HENT:      Head: Normocephalic and atraumatic.   Cardiovascular:      Rate and Rhythm: Normal rate and regular rhythm.      Heart sounds: Normal heart sounds. No murmur. No friction rub. No gallop.    Pulmonary:      Effort: Pulmonary effort is normal. No respiratory distress.      Breath sounds: Normal breath sounds. No wheezing or rales.   Chest:      Chest wall: No tenderness.   Abdominal:      General: Bowel sounds are normal. There is no distension.      Palpations: Abdomen is soft.      Tenderness: There is no abdominal tenderness.   Neurological:      Mental Status: He is alert and oriented to person, place, and time.         Significant Labs:   Recent Results (from the past 24 hour(s))   CBC auto differential    Collection Time: 10/14/20  4:40 AM   Result Value Ref Range    WBC 4.06 3.90 - 12.70 K/uL    RBC 2.52 (L) 4.60 - 6.20 M/uL    Hemoglobin 7.2 (L) 14.0 - 18.0 g/dL    Hematocrit 23.0 (L) 40.0 - 54.0 %    Mean Corpuscular Volume 91 82 - 98 fL    Mean Corpuscular Hemoglobin 28.6 27.0 - 31.0 pg    Mean Corpuscular Hemoglobin Conc 31.3 (L) 32.0 - 36.0 g/dL    RDW 17.6 (H) 11.5 - 14.5 %    Platelets 150 150 - 350 K/uL    MPV 9.5 9.2 - 12.9 fL    Immature Granulocytes 3.9 (H) 0.0 - 0.5 %    Gran # (ANC) 2.8 1.8 - 7.7 K/uL    Immature Grans (Abs) 0.16 (H) 0.00 - 0.04 K/uL    Lymph # 0.7 (L) 1.0 - 4.8 K/uL    Mono # 0.4 0.3 - 1.0 K/uL    Eos # 0.1 0.0 - 0.5 K/uL    Baso # 0.01 0.00 - 0.20 K/uL    nRBC 2 (A) 0 /100 WBC    Gran% 69.0 38.0 - 73.0 %    Lymph% 16.3 (L) 18.0 - 48.0 %    Mono% 8.9 4.0 - 15.0 %    Eosinophil% 1.7 0.0 - 8.0 %    Basophil% 0.2 0.0 - 1.9 %     Differential Method Automated          Diagnostic Results:  I have reviewed all pertinent imaging results/findings within the past 24 hours.

## 2020-10-14 NOTE — ASSESSMENT & PLAN NOTE
On metformin at home. Hold while inpatient. Add glucose checks and SSI to be adjusted if needed for goal glucose 140-180  Hemoglobin A1c pending

## 2020-10-15 ENCOUNTER — PATIENT MESSAGE (OUTPATIENT)
Dept: HEMATOLOGY/ONCOLOGY | Facility: CLINIC | Age: 75
End: 2020-10-15

## 2020-10-15 VITALS
SYSTOLIC BLOOD PRESSURE: 113 MMHG | RESPIRATION RATE: 19 BRPM | BODY MASS INDEX: 18.73 KG/M2 | HEART RATE: 89 BPM | OXYGEN SATURATION: 94 % | HEIGHT: 71 IN | TEMPERATURE: 99 F | WEIGHT: 133.81 LBS | DIASTOLIC BLOOD PRESSURE: 60 MMHG

## 2020-10-15 LAB
BASOPHILS # BLD AUTO: 0.01 K/UL (ref 0–0.2)
BASOPHILS NFR BLD: 0.2 % (ref 0–1.9)
DIFFERENTIAL METHOD: ABNORMAL
EOSINOPHIL # BLD AUTO: 0.1 K/UL (ref 0–0.5)
EOSINOPHIL NFR BLD: 1.3 % (ref 0–8)
ERYTHROCYTE [DISTWIDTH] IN BLOOD BY AUTOMATED COUNT: 17.2 % (ref 11.5–14.5)
ESTIMATED AVG GLUCOSE: 117 MG/DL (ref 68–131)
HBA1C MFR BLD HPLC: 5.7 % (ref 4–5.6)
HCT VFR BLD AUTO: 26.2 % (ref 40–54)
HGB BLD-MCNC: 8.1 G/DL (ref 14–18)
IMM GRANULOCYTES # BLD AUTO: 0.24 K/UL (ref 0–0.04)
IMM GRANULOCYTES NFR BLD AUTO: 5 % (ref 0–0.5)
LYMPHOCYTES # BLD AUTO: 0.5 K/UL (ref 1–4.8)
LYMPHOCYTES NFR BLD: 11.3 % (ref 18–48)
MCH RBC QN AUTO: 28.2 PG (ref 27–31)
MCHC RBC AUTO-ENTMCNC: 30.9 G/DL (ref 32–36)
MCV RBC AUTO: 91 FL (ref 82–98)
MONOCYTES # BLD AUTO: 0.5 K/UL (ref 0.3–1)
MONOCYTES NFR BLD: 10.9 % (ref 4–15)
NEUTROPHILS # BLD AUTO: 3.4 K/UL (ref 1.8–7.7)
NEUTROPHILS NFR BLD: 71.3 % (ref 38–73)
NRBC BLD-RTO: 1 /100 WBC
PLATELET # BLD AUTO: 152 K/UL (ref 150–350)
PMV BLD AUTO: 9.3 FL (ref 9.2–12.9)
POCT GLUCOSE: 123 MG/DL (ref 70–110)
POCT GLUCOSE: 135 MG/DL (ref 70–110)
RBC # BLD AUTO: 2.87 M/UL (ref 4.6–6.2)
WBC # BLD AUTO: 4.79 K/UL (ref 3.9–12.7)

## 2020-10-15 PROCEDURE — 94761 N-INVAS EAR/PLS OXIMETRY MLT: CPT

## 2020-10-15 PROCEDURE — 90670 PCV13 VACCINE IM: CPT | Performed by: HOSPITALIST

## 2020-10-15 PROCEDURE — 90472 IMMUNIZATION ADMIN EACH ADD: CPT | Performed by: HOSPITALIST

## 2020-10-15 PROCEDURE — 99232 SBSQ HOSP IP/OBS MODERATE 35: CPT | Mod: ,,, | Performed by: INTERNAL MEDICINE

## 2020-10-15 PROCEDURE — 85007 BL SMEAR W/DIFF WBC COUNT: CPT

## 2020-10-15 PROCEDURE — 90471 IMMUNIZATION ADMIN: CPT | Performed by: HOSPITALIST

## 2020-10-15 PROCEDURE — 25000003 PHARM REV CODE 250: Performed by: HOSPITALIST

## 2020-10-15 PROCEDURE — 36415 COLL VENOUS BLD VENIPUNCTURE: CPT

## 2020-10-15 PROCEDURE — 90694 VACC AIIV4 NO PRSRV 0.5ML IM: CPT | Performed by: HOSPITALIST

## 2020-10-15 PROCEDURE — 85027 COMPLETE CBC AUTOMATED: CPT

## 2020-10-15 PROCEDURE — 63600175 PHARM REV CODE 636 W HCPCS: Performed by: HOSPITALIST

## 2020-10-15 PROCEDURE — 99232 PR SUBSEQUENT HOSPITAL CARE,LEVL II: ICD-10-PCS | Mod: ,,, | Performed by: INTERNAL MEDICINE

## 2020-10-15 PROCEDURE — A4216 STERILE WATER/SALINE, 10 ML: HCPCS | Performed by: HOSPITALIST

## 2020-10-15 PROCEDURE — 83036 HEMOGLOBIN GLYCOSYLATED A1C: CPT

## 2020-10-15 PROCEDURE — 25000003 PHARM REV CODE 250: Performed by: INTERNAL MEDICINE

## 2020-10-15 RX ORDER — PANTOPRAZOLE SODIUM 40 MG/1
40 TABLET, DELAYED RELEASE ORAL DAILY
Qty: 30 TABLET | Refills: 2 | Status: SHIPPED | OUTPATIENT
Start: 2020-10-16 | End: 2021-01-14

## 2020-10-15 RX ADMIN — PREGABALIN 25 MG: 25 CAPSULE ORAL at 09:10

## 2020-10-15 RX ADMIN — PANTOPRAZOLE SODIUM 40 MG: 40 TABLET, DELAYED RELEASE ORAL at 09:10

## 2020-10-15 RX ADMIN — DRONABINOL 2.5 MG: 2.5 CAPSULE ORAL at 06:10

## 2020-10-15 RX ADMIN — Medication 3 ML: at 02:10

## 2020-10-15 RX ADMIN — Medication 3 ML: at 06:10

## 2020-10-15 RX ADMIN — A/SINGAPORE/GP1908/2015 IVR-180 (AN A/MICHIGAN/45/2015 (H1N1)PDM09-LIKE VIRUS, A/HONG KONG/4801/2014, NYMC X-263B (H3N2) (AN A/HONG KONG/4801/2014-LIKE VIRUS), AND B/BRISBANE/60/2008, WILD TYPE (A B/BRISBANE/60/2008-LIKE VIRUS) 0.5 ML: 15; 15; 15 INJECTION, SUSPENSION INTRAMUSCULAR at 02:10

## 2020-10-15 RX ADMIN — PNEUMOCOCCAL 13-VALENT CONJUGATE VACCINE 0.5 ML: 2.2; 2.2; 2.2; 2.2; 2.2; 4.4; 2.2; 2.2; 2.2; 2.2; 2.2; 2.2; 2.2 INJECTION, SUSPENSION INTRAMUSCULAR at 02:10

## 2020-10-15 NOTE — PLAN OF CARE
Important Message from Medicare and discharge appeal process reviewed with patient; patient was given opportunity to ask questions; after which, verbalized understanding of rights; copy was placed in medical record chart and one copy given to patient for his review and records       10/15/20 1144   Medicare Message   Important Message from Medicare regarding Discharge Appeal Rights Given to patient/caregiver;Explained to patient/caregiver;Signed/date by patient/caregiver   Date IMM was signed 10/15/20   Time IMM was signed [id number]

## 2020-10-15 NOTE — NURSING
Pt aa&o x4. Ambulatory with standby assist. Able to make all needs known. Bed low locked position, call light within reach, sr up x2. With bed alarm activated for pt safety. Will continue to monitor

## 2020-10-15 NOTE — PLAN OF CARE
WRITTEN HEALTHCARE DISCHARGE INFORMATION      Things that YOU are responsible for to Manage Your Care At Home:  1. Getting your prescriptions filled.  2. Taking you medications as directed. DO NOT MISS ANY DOSES!  3. Going to your follow-up doctor appointments. This is important because it allows the doctor to monitor your progress and to determine if any changes need to be made to your treatment plan.     If you are unable to make your follow up appointments, please call the number listed and reschedule this appointment.      After discharge, if you need assistance, you can call [facility] for 24/7 assistance at [phone]     If you are experience any signs or symptoms, Call your doctor or Call 911 and come to your nearest Emergency Room.     Thank you for choosing [facility] and allowing us to care for you.        You should receive a call from [facility] within 48-72 hours to help manage your care after discharge. Please try to make sure that you answer your phone for this important phone call.     Follow-up Information     Rosalio Azar MD On 11/3/2020.    Specialty: Internal Medicine  Why: Hospital discharge follow up/new patient appointment scheduled November 3rd @ 0900  Contact information:  [address] [facility] [address]  [phone]             Edgardo Alas MD On 10/19/2020.    Specialty: Hematology and Oncology  Why: @ 2:20PM; Hospital discharge follow up,   Contact information:  [address] [facility] [address]  [address]  [place] [address]  [phone]             Ej Ramsey MD On 10/29/2020.    Specialty: Gastroenterology  Why: @ 0900; Gastroenterology follow up; will need repeat EGD in 6 weeks; please bring list of meds, ID and insurance cards  Contact information:  [address] [place] [address]  [id number]  [facility] [place], operated by [facility]  [facility] [address]  [phone]

## 2020-10-15 NOTE — DISCHARGE INSTRUCTIONS
WRITTEN HEALTHCARE DISCHARGE INFORMATION      Things that YOU are responsible for to Manage Your Care At Home:  1. Getting your prescriptions filled.  2. Taking you medications as directed. DO NOT MISS ANY DOSES!  3. Going to your follow-up doctor appointments. This is important because it allows the doctor to monitor your progress and to determine if any changes need to be made to your treatment plan.     If you are unable to make your follow up appointments, please call the number listed and reschedule this appointment.      After discharge, if you need assistance, you can call [facility] for 24/7 assistance at [phone]     If you are experience any signs or symptoms, Call your doctor or Call [phone] and come to your nearest Emergency Room.     Thank you for choosing [facility] and allowing us to care for you.        You should receive a call from [facility] within 48-72 hours to help manage your care after discharge. Please try to make sure that you answer your phone for this important phone call.

## 2020-10-15 NOTE — SUBJECTIVE & OBJECTIVE
Interval History: The patient states he feels well but is still having difficulty sleeping.  The patient denies CP, SOB, abdominal pain, N/V, constipation, diarrhea.      Oncology Treatment Plan:   [No treatment plan]    Medications:  Continuous Infusions:  Scheduled Meds:   dronabinoL  2.5 mg Oral BID AC    pantoprazole  40 mg Oral Daily    pregabalin  25 mg Oral BID    senna-docusate 8.6-50 mg  1 tablet Oral Daily    sodium chloride 0.9%  3 mL Intravenous Q8H     PRN Meds:sodium chloride, acetaminophen, bisacodyL, dextrose 50%, dextrose 50%, glucagon (human recombinant), glucose, glucose, HYDROcodone-acetaminophen, insulin aspart U-100, melatonin, mineral oil, morphine, ondansetron, polyethylene glycol, promethazine     Review of Systems   Constitutional: Negative for chills and fever.   Respiratory: Negative for cough and shortness of breath.    Cardiovascular: Negative for chest pain and palpitations.   Gastrointestinal: Negative for abdominal pain, anal bleeding, blood in stool, constipation, diarrhea, nausea and vomiting.   Skin: Negative for color change and rash.   Neurological: Negative for headaches.   Psychiatric/Behavioral: Positive for sleep disturbance.     Objective:     Vital Signs (Most Recent):  Temp: 99.1 °F (37.3 °C) (10/15/20 1122)  Pulse: 89 (10/15/20 1122)  Resp: 19 (10/15/20 1122)  BP: 113/60 (10/15/20 1122)  SpO2: (!) 94 % (10/15/20 1122) Vital Signs (24h Range):  Temp:  [98.3 °F (36.8 °C)-99.1 °F (37.3 °C)] 99.1 °F (37.3 °C)  Pulse:  [83-94] 89  Resp:  [16-19] 19  SpO2:  [93 %-98 %] 94 %  BP: (111-133)/(56-64) 113/60     Weight: 60.7 kg (133 lb 13.1 oz)  Body mass index is 18.66 kg/m².  Body surface area is 1.74 meters squared.      Intake/Output Summary (Last 24 hours) at 10/15/2020 1508  Last data filed at 10/15/2020 0900  Gross per 24 hour   Intake 480 ml   Output --   Net 480 ml       Physical Exam  Constitutional:       General: He is not in acute distress.     Appearance: He is  well-developed. He is not diaphoretic.      Comments: Thin, cachectic   HENT:      Head: Normocephalic and atraumatic.   Cardiovascular:      Rate and Rhythm: Normal rate and regular rhythm.      Heart sounds: Normal heart sounds. No murmur. No friction rub. No gallop.    Pulmonary:      Effort: Pulmonary effort is normal. No respiratory distress.      Breath sounds: Normal breath sounds. No wheezing or rales.   Chest:      Chest wall: No tenderness.   Abdominal:      General: Bowel sounds are normal. There is no distension.      Palpations: Abdomen is soft.      Tenderness: There is no abdominal tenderness.   Neurological:      Mental Status: He is alert and oriented to person, place, and time.         Significant Labs:   Recent Results (from the past 24 hour(s))   POCT glucose    Collection Time: 10/14/20  7:26 PM   Result Value Ref Range    POCT Glucose 111 (H) 70 - 110 mg/dL   CBC auto differential    Collection Time: 10/14/20 11:07 PM   Result Value Ref Range    WBC 5.64 3.90 - 12.70 K/uL    RBC 2.82 (L) 4.60 - 6.20 M/uL    Hemoglobin 8.0 (L) 14.0 - 18.0 g/dL    Hematocrit 25.2 (L) 40.0 - 54.0 %    Mean Corpuscular Volume 89 82 - 98 fL    Mean Corpuscular Hemoglobin 28.4 27.0 - 31.0 pg    Mean Corpuscular Hemoglobin Conc 31.7 (L) 32.0 - 36.0 g/dL    RDW 17.3 (H) 11.5 - 14.5 %    Platelets 147 (L) 150 - 350 K/uL    MPV 9.0 (L) 9.2 - 12.9 fL    Immature Granulocytes 3.7 (H) 0.0 - 0.5 %    Gran # (ANC) 4.1 1.8 - 7.7 K/uL    Immature Grans (Abs) 0.21 (H) 0.00 - 0.04 K/uL    Lymph # 0.7 (L) 1.0 - 4.8 K/uL    Mono # 0.5 0.3 - 1.0 K/uL    Eos # 0.1 0.0 - 0.5 K/uL    Baso # 0.02 0.00 - 0.20 K/uL    nRBC 1 (A) 0 /100 WBC    Gran% 72.3 38.0 - 73.0 %    Lymph% 12.2 (L) 18.0 - 48.0 %    Mono% 9.6 4.0 - 15.0 %    Eosinophil% 1.8 0.0 - 8.0 %    Basophil% 0.4 0.0 - 1.9 %    Differential Method Automated    CBC auto differential    Collection Time: 10/15/20  5:16 AM   Result Value Ref Range    WBC 4.79 3.90 - 12.70 K/uL    RBC  2.87 (L) 4.60 - 6.20 M/uL    Hemoglobin 8.1 (L) 14.0 - 18.0 g/dL    Hematocrit 26.2 (L) 40.0 - 54.0 %    Mean Corpuscular Volume 91 82 - 98 fL    Mean Corpuscular Hemoglobin 28.2 27.0 - 31.0 pg    Mean Corpuscular Hemoglobin Conc 30.9 (L) 32.0 - 36.0 g/dL    RDW 17.2 (H) 11.5 - 14.5 %    Platelets 152 150 - 350 K/uL    MPV 9.3 9.2 - 12.9 fL    Immature Granulocytes 5.0 (H) 0.0 - 0.5 %    Gran # (ANC) 3.4 1.8 - 7.7 K/uL    Immature Grans (Abs) 0.24 (H) 0.00 - 0.04 K/uL    Lymph # 0.5 (L) 1.0 - 4.8 K/uL    Mono # 0.5 0.3 - 1.0 K/uL    Eos # 0.1 0.0 - 0.5 K/uL    Baso # 0.01 0.00 - 0.20 K/uL    nRBC 1 (A) 0 /100 WBC    Gran% 71.3 38.0 - 73.0 %    Lymph% 11.3 (L) 18.0 - 48.0 %    Mono% 10.9 4.0 - 15.0 %    Eosinophil% 1.3 0.0 - 8.0 %    Basophil% 0.2 0.0 - 1.9 %    Differential Method Automated    Hemoglobin A1c    Collection Time: 10/15/20  5:16 AM   Result Value Ref Range    Hemoglobin A1C 5.7 (H) 4.0 - 5.6 %    Estimated Avg Glucose 117 68 - 131 mg/dL   POCT glucose    Collection Time: 10/15/20  7:32 AM   Result Value Ref Range    POCT Glucose 135 (H) 70 - 110 mg/dL         Diagnostic Results:  I have reviewed all pertinent imaging results/findings within the past 24 hours.

## 2020-10-15 NOTE — ASSESSMENT & PLAN NOTE
-Pt with continued difficulty sleeping  -Pt started on melatonin yesterday  -Primary team managing

## 2020-10-15 NOTE — ASSESSMENT & PLAN NOTE
-The patient has advanced prostate cancer and is s/p multiple lines of treatment including Lupron, palliative radiation, provenge (xofigo), zytiga (abiratoerone) and prednisone, enzalutamide in combination with [id number] (EZH2 inhibitor) as part of a phase ½ trial, [id number] trial (start 3/03/20), and most recently casodex and Lupron   -Pt is to have outpatient scans: NM bone scan and CT C/A/P on 10/16/20  -No need for inpatient treatment

## 2020-10-15 NOTE — PLAN OF CARE
EDUCATION:  Patient discharge instructions updated to include educational information on Anemia that will be printed on patient's AVS to review upon discharge; TN in to discuss with patient; requested that I speak to his daughter, Kitty instead; Information reviewed via phone with daughter and include  signs and symptoms to look for and call the doctor if experiencing, and symptoms that may indicate a medical emergency: CALL 911.    All questions answered.         Reviewed with daughter things that she can assist pt with to better manage his care at home to include taking all medications as precscribed and make sure patient attend all follow up visits;     F/U appointments PCP/GI/Onc were reviewed;  verbalized understanding     NurseNina notified that all discharge planning needs have been addressed and patient can be discharged from  standpoint        10/15/20 1242   Final Note   Assessment Type Final Discharge Note   Anticipated Discharge Disposition Home   What phone number can be called within the next 1-3 days to see how you are doing after discharge? [phone]   Hospital Follow Up  Appt(s) scheduled? Yes   Discharge plans and expectations educations in teach back method with documentation complete? Yes   Right Care Referral Info   Post Acute Recommendation No Care   Post-Acute Status   Post-Acute Authorization Other   Other Status No Post-Acute Service Needs   Discharge Delays None known at this time

## 2020-10-15 NOTE — ASSESSMENT & PLAN NOTE
-The patient was found to have hemoglobin of 5.6g/dL on admission  and is s/p 2 units of PRBC's  -Hemoglobin today was 8.1g/dL  -EGD 10/14/20 showed no abnormalities  -Gi following  -Iron studies are suggestive of anemia of chronic disease  -B12 and folate are normal and haptoglobin and LDH are not suggestive of a hemolytic anemia.  -Anemia could be due to BM infiltration from underlying prostate cancer  -Hemoglobin stable  -Pt can be discharged with close follow up in our clinic

## 2020-10-15 NOTE — PROGRESS NOTES
[facility]  Hematology/Oncology  Progress Note    Patient Name: Colten Franco  Admission Date: 10/12/2020  Hospital Length of Stay: 3 days  Code Status: Full Code     Subjective:     HPI:  Pt is a 75 y.o. male with neuropathy, DMII, prostate cancer who presented to the hospital after his screening labs showed a hemoglobin of 5.6g/dL.  Dr Trujillo in the ER was contacted about the lab result and called the patient to come to the ER for a blood transfusion.  The patient states he has been having black stools off/on for some time.  He denies any BRBPR.  He complains of chronic fatigue.  The patient denies CP, SOB, abdominal pain, N/V, constipation, diarrhea.  The patient denies fever, chills, night sweats, weight loss, new lumps or bumps, easy bruising or bleeding.    Interval History: The patient states he feels well but is still having difficulty sleeping.  The patient denies CP, SOB, abdominal pain, N/V, constipation, diarrhea.      Oncology Treatment Plan:   [No treatment plan]    Medications:  Continuous Infusions:  Scheduled Meds:   dronabinoL  2.5 mg Oral BID AC    pantoprazole  40 mg Oral Daily    pregabalin  25 mg Oral BID    senna-docusate 8.6-50 mg  1 tablet Oral Daily    sodium chloride 0.9%  3 mL Intravenous Q8H     PRN Meds:sodium chloride, acetaminophen, bisacodyL, dextrose 50%, dextrose 50%, glucagon (human recombinant), glucose, glucose, HYDROcodone-acetaminophen, insulin aspart U-100, melatonin, mineral oil, morphine, ondansetron, polyethylene glycol, promethazine     Review of Systems   Constitutional: Negative for chills and fever.   Respiratory: Negative for cough and shortness of breath.    Cardiovascular: Negative for chest pain and palpitations.   Gastrointestinal: Negative for abdominal pain, anal bleeding, blood in stool, constipation, diarrhea, nausea and vomiting.   Skin: Negative for color change and rash.   Neurological: Negative for headaches.    Psychiatric/Behavioral: Positive for sleep disturbance.     Objective:     Vital Signs (Most Recent):  Temp: 99.1 °F (37.3 °C) (10/15/20 1122)  Pulse: 89 (10/15/20 1122)  Resp: 19 (10/15/20 1122)  BP: 113/60 (10/15/20 1122)  SpO2: (!) 94 % (10/15/20 1122) Vital Signs (24h Range):  Temp:  [98.3 °F (36.8 °C)-99.1 °F (37.3 °C)] 99.1 °F (37.3 °C)  Pulse:  [83-94] 89  Resp:  [16-19] 19  SpO2:  [93 %-98 %] 94 %  BP: (111-133)/(56-64) 113/60     Weight: 60.7 kg (133 lb 13.1 oz)  Body mass index is 18.66 kg/m².  Body surface area is 1.74 meters squared.      Intake/Output Summary (Last 24 hours) at 10/15/2020 1508  Last data filed at 10/15/2020 0900  Gross per 24 hour   Intake 480 ml   Output --   Net 480 ml       Physical Exam  Constitutional:       General: He is not in acute distress.     Appearance: He is well-developed. He is not diaphoretic.      Comments: Thin, cachectic   HENT:      Head: Normocephalic and atraumatic.   Cardiovascular:      Rate and Rhythm: Normal rate and regular rhythm.      Heart sounds: Normal heart sounds. No murmur. No friction rub. No gallop.    Pulmonary:      Effort: Pulmonary effort is normal. No respiratory distress.      Breath sounds: Normal breath sounds. No wheezing or rales.   Chest:      Chest wall: No tenderness.   Abdominal:      General: Bowel sounds are normal. There is no distension.      Palpations: Abdomen is soft.      Tenderness: There is no abdominal tenderness.   Neurological:      Mental Status: He is alert and oriented to person, place, and time.         Significant Labs:   Recent Results (from the past 24 hour(s))   POCT glucose    Collection Time: 10/14/20  7:26 PM   Result Value Ref Range    POCT Glucose 111 (H) 70 - 110 mg/dL   CBC auto differential    Collection Time: 10/14/20 11:07 PM   Result Value Ref Range    WBC 5.64 3.90 - 12.70 K/uL    RBC 2.82 (L) 4.60 - 6.20 M/uL    Hemoglobin 8.0 (L) 14.0 - 18.0 g/dL    Hematocrit 25.2 (L) 40.0 - 54.0 %    Mean  Corpuscular Volume 89 82 - 98 fL    Mean Corpuscular Hemoglobin 28.4 27.0 - 31.0 pg    Mean Corpuscular Hemoglobin Conc 31.7 (L) 32.0 - 36.0 g/dL    RDW 17.3 (H) 11.5 - 14.5 %    Platelets 147 (L) 150 - 350 K/uL    MPV 9.0 (L) 9.2 - 12.9 fL    Immature Granulocytes 3.7 (H) 0.0 - 0.5 %    Gran # (ANC) 4.1 1.8 - 7.7 K/uL    Immature Grans (Abs) 0.21 (H) 0.00 - 0.04 K/uL    Lymph # 0.7 (L) 1.0 - 4.8 K/uL    Mono # 0.5 0.3 - 1.0 K/uL    Eos # 0.1 0.0 - 0.5 K/uL    Baso # 0.02 0.00 - 0.20 K/uL    nRBC 1 (A) 0 /100 WBC    Gran% 72.3 38.0 - 73.0 %    Lymph% 12.2 (L) 18.0 - 48.0 %    Mono% 9.6 4.0 - 15.0 %    Eosinophil% 1.8 0.0 - 8.0 %    Basophil% 0.4 0.0 - 1.9 %    Differential Method Automated    CBC auto differential    Collection Time: 10/15/20  5:16 AM   Result Value Ref Range    WBC 4.79 3.90 - 12.70 K/uL    RBC 2.87 (L) 4.60 - 6.20 M/uL    Hemoglobin 8.1 (L) 14.0 - 18.0 g/dL    Hematocrit 26.2 (L) 40.0 - 54.0 %    Mean Corpuscular Volume 91 82 - 98 fL    Mean Corpuscular Hemoglobin 28.2 27.0 - 31.0 pg    Mean Corpuscular Hemoglobin Conc 30.9 (L) 32.0 - 36.0 g/dL    RDW 17.2 (H) 11.5 - 14.5 %    Platelets 152 150 - 350 K/uL    MPV 9.3 9.2 - 12.9 fL    Immature Granulocytes 5.0 (H) 0.0 - 0.5 %    Gran # (ANC) 3.4 1.8 - 7.7 K/uL    Immature Grans (Abs) 0.24 (H) 0.00 - 0.04 K/uL    Lymph # 0.5 (L) 1.0 - 4.8 K/uL    Mono # 0.5 0.3 - 1.0 K/uL    Eos # 0.1 0.0 - 0.5 K/uL    Baso # 0.01 0.00 - 0.20 K/uL    nRBC 1 (A) 0 /100 WBC    Gran% 71.3 38.0 - 73.0 %    Lymph% 11.3 (L) 18.0 - 48.0 %    Mono% 10.9 4.0 - 15.0 %    Eosinophil% 1.3 0.0 - 8.0 %    Basophil% 0.2 0.0 - 1.9 %    Differential Method Automated    Hemoglobin A1c    Collection Time: 10/15/20  5:16 AM   Result Value Ref Range    Hemoglobin A1C 5.7 (H) 4.0 - 5.6 %    Estimated Avg Glucose 117 68 - 131 mg/dL   POCT glucose    Collection Time: 10/15/20  7:32 AM   Result Value Ref Range    POCT Glucose 135 (H) 70 - 110 mg/dL         Diagnostic Results:  I have  reviewed all pertinent imaging results/findings within the past 24 hours.    Assessment/Plan:     * Symptomatic anemia  -The patient was found to have hemoglobin of 5.6g/dL on admission  and is s/p 2 units of PRBC's  -Hemoglobin today was 8.1g/dL  -EGD 10/14/20 showed no abnormalities  -Gi following  -Iron studies are suggestive of anemia of chronic disease  -B12 and folate are normal and haptoglobin and LDH are not suggestive of a hemolytic anemia.  -Anemia could be due to BM infiltration from underlying prostate cancer  -Hemoglobin stable  -Pt can be discharged with close follow up in our clinic    Prostate cancer  -The patient has advanced prostate cancer and is s/p multiple lines of treatment including Lupron, palliative radiation, provenge (xofigo), zytiga (abiratoerone) and prednisone, enzalutamide in combination with [id number] (EZH2 inhibitor) as part of a phase ½ trial, [id number] trial (start 3/03/20), and most recently casodex and Lupron   -Pt is to have outpatient scans: NM bone scan and CT C/A/P on 10/16/20  -No need for inpatient treatment    Other insomnia  -Pt with continued difficulty sleeping  -Pt started on melatonin yesterday  -Primary team managing    Hematology/Oncology service will follow-up with patient. Please contact us if you have any additional questions.       Edgardo Alas MD  Hematology/Oncology  [facility]

## 2020-10-16 ENCOUNTER — PATIENT OUTREACH (OUTPATIENT)
Dept: ADMINISTRATIVE | Facility: CLINIC | Age: 75
End: 2020-10-16

## 2020-10-16 ENCOUNTER — TELEPHONE (OUTPATIENT)
Dept: HEMATOLOGY/ONCOLOGY | Facility: CLINIC | Age: 75
End: 2020-10-16

## 2020-10-16 NOTE — HOSPITAL COURSE
"Mr Franco presented with symptomatic anemia of 5.6 g/dL. Given that patient reported intermittent "dark stools" at home, this was concerning for slow gastrointestinal bleed. Blood pressure stable. Transfused 2 units of PRBCs with adequate response. Placed on pantoprazole IV and consulted Cardiology. Oncology also on board for co-management as he does have prostate cancer. Underwent upper endoscopy which did not reveal source for bleeding. Pantoprazole therefore changed to 40 mg PO daily. Hgb improved on its own and patient tolerated diet. Hgb improved despite further reports of "dark" stools. No evidence of lysis on labs. Gastroenterology offered colonoscopy but patient is not sure if he wants to pursue this. Will think about it. Is to follow up with GI and oncology. Discussed avoidance of NSAIDs, antiplatelets and blood thinners of which he uses none. Wife at bedside during our conversation day prior to discharge. Plan remained the same. Diabetic diet. Activity as tolerated.   "

## 2020-10-16 NOTE — DISCHARGE SUMMARY
[facility] Hospital Medicine  Discharge Summary      Patient Name: Colten Franco  MRN: [id number]  Admission Date: 10/12/2020  Hospital Length of Stay: 3 days  Discharge Date and Time:  10/15/2020 6:53 PM  Attending Physician: No att. providers found   Discharging Provider: Jaycee Chacon MD  Primary Care Provider: Rosalio Azar MD      HPI:   Colten Franco is a 75 y.o. male that (in part)  has a past medical history of DMII (diabetes mellitus, type 2), Neuropathy due to chemotherapeutic drug, and Prostate cancer.  has a past surgical history that includes Prostatectomy. Presents to [facility] Emergency Department after receiving a phone call from his primary care physician who stated that the patient had abnormal lab results.  He was anemic.  His hemoglobin was 5.6.  Patient complains of generalized weakness, fatigue, shortness of breath at rest, and dyspnea with exertion.  Some dizziness when he stands too quickly.  Denies hemoptysis, hematemesis, melena, hematochezia, or hematuria.  No other obvious blood loss.  History of prostate cancer and has received multiple rounds of chemotherapy as well as palliative radiation.  He is being followed by Hematology-Oncology as an outpatient.  He was recently seen by Dr. CADEN Alas.      In the emergency department routine laboratory studies confirmed normocytic anemia with a hemoglobin of 5.3.  No other significant lab abnormalities noted other than decreased TIBC and transferrin.  2 units of PRBCs were ordered for transfusion.  Will check after 2nd unit.  Patient understands he may need additional blood after that.  Recommendations given by Hematology-Oncology for further anemia workup.     Hospital medicine has been asked to admit to inpatient for further evaluation and treatment.     Procedure(s) (LRB):  EGD (ESOPHAGOGASTRODUODENOSCOPY) (N/A)      Hospital Course:   Mr Franco presented with symptomatic anemia of 5.6  "g/dL. Given that patient reported intermittent "dark stools" at home, this was concerning for slow gastrointestinal bleed. Blood pressure stable. Transfused 2 units of PRBCs with adequate response. Placed on pantoprazole IV and consulted Cardiology. Oncology also on board for co-management as he does have prostate cancer. Underwent upper endoscopy which did not reveal source for bleeding. Pantoprazole therefore changed to 40 mg PO daily. Hgb improved on its own and patient tolerated diet. Hgb improved despite further reports of "dark" stools. No evidence of lysis on labs. Gastroenterology offered colonoscopy but patient is not sure if he wants to pursue this. Will think about it. Is to follow up with GI and oncology. Discussed avoidance of NSAIDs, antiplatelets and blood thinners of which he uses none. Wife at bedside during our conversation day prior to discharge. Plan remained the same. Diabetic diet. Activity as tolerated.      Consults:   Consults (From admission, onward)        Status Ordering Provider     Inpatient consult to Gastroenterology  Once     Provider:  Silviano Martinez MD    Completed IRENE EUCEDA     Inpatient consult to Hematology/Oncology - [facility]  Once     Provider:  Edgardo Alas MD    Completed DICK RAMOS        Final Active Diagnoses:    Diagnosis Date Noted POA    PRINCIPAL PROBLEM:  Symptomatic anemia [D64.9] 10/12/2020 Yes    Tobacco abuse [Z72.0] 10/13/2020 Yes    Prostate cancer [C61]  Yes    DMII (diabetes mellitus, type 2) [E11.9]  Yes    Other insomnia [G47.09]  Yes      Problems Resolved During this Admission:       Discharged Condition: stable    Disposition: Home or Self Care    Follow Up:  Follow-up Information     Rosalio Azar MD On 11/3/2020.    Specialty: Internal Medicine  Why: Hospital discharge follow up/new patient appointment scheduled November 3rd @ 0900  Contact information:  [facility] [place] [address]  [phone]             Edgardo NICHOLAS" MD [place] On 10/19/2020.    Specialty: Hematology and Oncology  Why: @ 2:20PM; Hospital discharge follow up,   Contact information:  [address] [facility] [address]  [address]  [place] [id number] [phone]             Ej Ramsey MD On 10/29/2020.    Specialty: Gastroenterology  Why: @ 0900; Gastroenterology follow up; will need repeat EGD in 6 weeks; please bring list of meds, ID and insurance cards  Contact information:  [address] [place] [address]  [id number]  [facility]  [place] [id number] [phone]                 Medications:  Reconciled Home Medications:      Medication List      CHANGE how you take these medications    pantoprazole 40 MG tablet  Commonly known as: PROTONIX  Take 1 tablet (40 mg total) by mouth once daily.  What changed:   · medication strength  · how much to take  · how to take this  · when to take this        CONTINUE taking these medications    dronabinoL 2.5 MG capsule  Commonly known as: MARINOL  Take 1 capsule (2.5 mg total) by mouth 2 (two) times daily before meals. Take 1 capsule (2.5mg total) by mouth in the AM before breakfast, Take 2 capsules (5mg total) by mouth in the PM before dinner.     metFORMIN 500 MG tablet  Commonly known as: GLUCOPHAGE  Take 500 mg by mouth 2 (two) times daily with meals.     ondansetron IVPB  Commonly known as: ZOFRAN     pregabalin 25 MG capsule  Commonly known as: LYRICA  Take 1 capsule (25 mg total) by mouth 2 (two) times daily.     zolpidem 5 mg Subl            Indwelling Lines/Drains at time of discharge:   Lines/Drains/Airways     None                 Time spent on the discharge of patient: > 35 minutes  Patient was seen and examined on the date of discharge and determined to be suitable for discharge.         Jaycee Chacon MD  Department of Hospital Medicine  [facility]

## 2020-10-16 NOTE — TELEPHONE ENCOUNTER
Called pt/daughter discussed upcoming appts. Scans rescheduled to 10/22/2020. Follow up appt also scheduled on 10/23/2020. Pt /daughter acknowledged.   ~Jesus

## 2020-10-16 NOTE — PATIENT INSTRUCTIONS

## 2020-10-19 NOTE — ANESTHESIA POSTPROCEDURE EVALUATION
Anesthesia Post Evaluation    Patient: Colten Franco    Procedure(s) Performed: Procedure(s) (LRB):  EGD (ESOPHAGOGASTRODUODENOSCOPY) (N/A)    Final Anesthesia Type: general    Patient location during evaluation: GI PACU  Patient participation: Yes- Able to Participate  Level of consciousness: awake and alert  Post-procedure vital signs: reviewed and stable  Pain management: adequate  Airway patency: patent    PONV status at discharge: No PONV  Anesthetic complications: no      Cardiovascular status: blood pressure returned to baseline and hemodynamically stable  Respiratory status: unassisted and spontaneous ventilation  Hydration status: euvolemic  Follow-up not needed.          Vitals Value Taken Time   /60 10/15/20 1122   Temp 37.3 °C (99.1 °F) 10/15/20 1122   Pulse 89 10/15/20 1122   Resp 19 10/15/20 1122   SpO2 94 % 10/15/20 1122         Event Time   Out of Recovery 10/14/2020 11:30:34         Pain/Hieu Score: No data recorded

## 2020-10-20 ENCOUNTER — PATIENT OUTREACH (OUTPATIENT)
Dept: ADMINISTRATIVE | Facility: HOSPITAL | Age: 75
End: 2020-10-20

## 2020-10-20 DIAGNOSIS — Z01.00 DIABETIC EYE EXAM: Primary | ICD-10-CM

## 2020-10-20 DIAGNOSIS — E11.9 DIABETIC EYE EXAM: Primary | ICD-10-CM

## 2020-10-22 ENCOUNTER — HOSPITAL ENCOUNTER (OUTPATIENT)
Dept: RADIOLOGY | Facility: HOSPITAL | Age: 75
Discharge: HOME | End: 2020-10-22
Attending: INTERNAL MEDICINE
Payer: OTHER GOVERNMENT

## 2020-10-22 DIAGNOSIS — C61 PROSTATE CANCER METASTATIC TO BONE: ICD-10-CM

## 2020-10-22 DIAGNOSIS — C79.51 PROSTATE CANCER METASTATIC TO BONE: ICD-10-CM

## 2020-10-22 PROCEDURE — 78306 BONE IMAGING WHOLE BODY: CPT | Mod: 26,,, | Performed by: RADIOLOGY

## 2020-10-22 PROCEDURE — 78306 NM BONE SCAN WHOLE BODY: ICD-10-PCS | Mod: 26,,, | Performed by: RADIOLOGY

## 2020-10-22 PROCEDURE — 25500020 PHARM REV CODE 255: Performed by: INTERNAL MEDICINE

## 2020-10-22 PROCEDURE — 74177 CT CHEST ABDOMEN PELVIS WITH CONTRAST (XPD): ICD-10-PCS | Mod: 26,,, | Performed by: RADIOLOGY

## 2020-10-22 PROCEDURE — 74177 CT ABD & PELVIS W/CONTRAST: CPT | Mod: 26,,, | Performed by: RADIOLOGY

## 2020-10-22 PROCEDURE — A9503 TC99M MEDRONATE: HCPCS

## 2020-10-22 PROCEDURE — 71260 CT THORAX DX C+: CPT | Mod: 26,,, | Performed by: RADIOLOGY

## 2020-10-22 PROCEDURE — 71260 CT CHEST ABDOMEN PELVIS WITH CONTRAST (XPD): ICD-10-PCS | Mod: 26,,, | Performed by: RADIOLOGY

## 2020-10-22 PROCEDURE — 74177 CT ABD & PELVIS W/CONTRAST: CPT | Mod: TC

## 2020-10-22 RX ADMIN — IOHEXOL 75 ML: 350 INJECTION, SOLUTION INTRAVENOUS at 08:10

## 2020-10-22 RX ADMIN — IOHEXOL 15 ML: 300 INJECTION, SOLUTION INTRAVENOUS at 07:10

## 2020-10-23 ENCOUNTER — PATIENT MESSAGE (OUTPATIENT)
Dept: HEMATOLOGY/ONCOLOGY | Facility: CLINIC | Age: 75
End: 2020-10-23

## 2020-10-23 ENCOUNTER — TELEPHONE (OUTPATIENT)
Dept: HEMATOLOGY/ONCOLOGY | Facility: HOSPITAL | Age: 75
End: 2020-10-23

## 2020-10-23 NOTE — TELEPHONE ENCOUNTER
I called the patient and left him a message to call me back at [phone].     Edgardo Alas MD  Hematology and Oncology  [facility]  Office:[phone]  Fax: [phone]

## 2020-10-23 NOTE — TELEPHONE ENCOUNTER
I spoke to the daughter whom stated her father was continuing to have dark colored stools and difficulty ambulating due to weakness.  She also stated he was having fever up to 102.  I instructed her that he needed to go to the hospital for an evaluation for infection and GI bleeding.  She expressed understanding and stated she would bring him into the hospital.  All questions were answered to her satisfaction.    Edgardo Alas MD  Hematology and Oncology  [facility]  Office:[phone]  Fax: [phone]

## 2020-10-25 NOTE — PROGRESS NOTES
PATIENT: Colten Franco  MRN: [id number]  DATE: 10/26/2020      Diagnosis:   1. Prostate cancer metastatic to bone    2. Normocytic anemia    3. Lytic bone lesion of femur    4. LAD (lymphadenopathy) of left cervical region    5. Bicytopenia    6. Contracture of muscle of right hand    7. Neuropathy due to chemotherapeutic drug    8. Anorexia    9. Type 2 diabetes mellitus without complication, without long-term current use of insulin    10. Other insomnia        Chief Complaint: Prostate Cancer (followup)    Oncologic History:    The patient was originally diagnosed with prostate cancer in 1998 s/p prostatectomy.  He later developed metastatic cancer to the bone.  He was previously treated by Dr. Christiansen in [facility]. Selvin has been previously treated with Lupron, palliative radiation, provenge (xofigo), zytiga (abiratoerone) and prednisone, enzalutamide in combination with [id number] (EZH2 inhibitor) as part of a phase ½ trial, EWQ738 trial (start 3/03/20) from which the patient developed hand and foot syndrome after the second cycle.  Most recently the patient was treated with casodex and Lupron starting 9/10/20 after his PSA continued to climb.  Last scans reported in his outside records on 4/03/20 showed extensive blastic osseous disease with no evidence of new disease and a stable AAA measuring 3.5cm.      Subjective:    Interval History: Mr. Franco is a 75 y.o. male with neuropathy, DMII presents for follow up for metastatic prostate cancer.  Since the last clinic visit the patient was admitted to the hospital from 10/12/20 to 10/15/20 for symptomatic anemia.  He presented to the ER with hemoglobin of 5.6g/dL with complaint of melena.  He received 2 units of PRBCs with hemoglobin of 8.1g/dL on d/c.  He underwent an EGD on 10/14/20 showing no signs of active bleeding.  On 10/22/20 he underwent a CT of the C/A/P on 10/22/20 showing a 1.9 x 1.5 cm low density structure distorting the left internal  jugular vein and may be a possible enlarged cervical LN, heterogenous thyroid, a small pericardial effusion, widespread changes in the bones, thickening of the left adrenal gland, multiple low-density right renal masses present with the largest measuring 1.6 x 1.7 cm in size most consistent with multiple renal cysts, aneurysmal dilatation of the infrarenal abdominal aorta which measures approximately 3.5 x 3.2 cm in greatest transverse dimensions.  He underwent a NM bone scan on 10/22/20 showing foci of increased tracer accumulation identified throughout the thoracic and lumbar spine, ribs, pelvis, femurs, humeri, tibias, fibulas, radii, ulnas, and sternum.  The patient continues to complain of pain in his right hand.  He also endorses soft stools.  The patient denies any melena, BRBPR, hemoptysis, hematemesis, hematuria.  He denies fatigue but endorses weakness.  The patient denies CP, SOB, abdominal pain, N/V, constipation, diarrhea.  He has had periodic fever at home but none currently.  The patient denies chills, night sweats, weight loss, new lumps or bumps, easy bruising or bleeding.    Past Medical History:   Past Medical History:   Diagnosis Date    DMII (diabetes mellitus, type 2)     Neuropathy due to chemotherapeutic drug     Prostate cancer        Past Surgical HIstory:   Past Surgical History:   Procedure Laterality Date    ESOPHAGOGASTRODUODENOSCOPY N/A 10/14/2020    Procedure: EGD (ESOPHAGOGASTRODUODENOSCOPY);  Surgeon: Silviano Martinez MD;  Location: [facility];  Service: Endoscopy;  Laterality: N/A;    PROSTATECTOMY         Family History: No family history on file.    Social History:  reports that he has been smoking cigarettes. He has a 25.00 pack-year smoking history. He has never used smokeless tobacco. He reports current alcohol use. He reports that he does not use drugs.    Allergies:  Review of patient's allergies indicates:  No Known Allergies    Medications:  Current Outpatient  "Medications   Medication Sig Dispense Refill    dronabinoL (MARINOL) 2.5 MG capsule Take 1 capsule (2.5 mg total) by mouth 2 (two) times daily before meals. Take 1 capsule (2.5mg total) by mouth in the AM before breakfast, Take 2 capsules (5mg total) by mouth in the PM before dinner. 90 capsule 5    ondansetron (ZOFRAN) IVPB       pantoprazole (PROTONIX) 40 MG tablet Take 1 tablet (40 mg total) by mouth once daily. 30 tablet 2    pregabalin (LYRICA) 25 MG capsule Take 1 capsule (25 mg total) by mouth 2 (two) times daily. 90 capsule 2    zolpidem 5 mg Subl       metFORMIN (GLUCOPHAGE) 500 MG tablet Take 500 mg by mouth 2 (two) times daily with meals.       No current facility-administered medications for this visit.        Review of Systems   Constitutional: Positive for fever (occasional). Negative for chills, diaphoresis, fatigue and unexpected weight change.   HENT: Negative for sore throat and trouble swallowing.    Respiratory: Negative for cough and shortness of breath.    Cardiovascular: Negative for chest pain and palpitations.   Gastrointestinal: Negative for abdominal pain, constipation, diarrhea, nausea and vomiting.   Musculoskeletal:        Right hand pain   Skin: Negative for color change and rash.   Neurological: Positive for weakness. Negative for headaches.   Hematological: Negative for adenopathy. Does not bruise/bleed easily.       ECOG Performance Status: 3   Objective:      Vitals:   Vitals:    10/26/20 1053   BP: (!) 92/50   BP Location: Left arm   Patient Position: Sitting   BP Method: Medium (Automatic)   Pulse: 107   Temp: 99.1 °F (37.3 °C)   TempSrc: Oral   SpO2: 95%   Weight: 59.9 kg (132 lb 0.9 oz)   Height: 5' 11" (1.803 m)       Physical Exam  Constitutional:       General: He is not in acute distress.     Appearance: He is well-developed. He is not diaphoretic.      Comments: Thin, cachectic   HENT:      Head: Normocephalic and atraumatic.   Cardiovascular:      Rate and Rhythm: " Normal rate and regular rhythm.      Heart sounds: Normal heart sounds. No murmur. No friction rub. No gallop.    Pulmonary:      Effort: Pulmonary effort is normal. No respiratory distress.      Breath sounds: Normal breath sounds. No wheezing or rales.   Chest:      Chest wall: No tenderness.   Abdominal:      General: Bowel sounds are normal. There is no distension.      Palpations: Abdomen is soft. There is no mass.      Tenderness: There is no abdominal tenderness. There is no rebound.   Musculoskeletal:         General: No tenderness.      Comments: Pt with contractures of the right hand with the fingers being unable to fully extend.  Pt with tightness in the right hand and feet   Skin:     General: Skin is warm and dry.      Findings: No erythema or rash.   Neurological:      Mental Status: He is alert and oriented to person, place, and time.      Coordination: Coordination normal.   Psychiatric:         Behavior: Behavior normal.         Laboratory Data:  Lab Visit on 10/26/2020   Component Date Value Ref Range Status    Pathologist Review 10/26/2020 Review required   Final   Patient Outreach on 10/20/2020   Component Date Value Ref Range Status    Fecal Immunochemical Test (iFOBT) 09/02/2020 Negative   Final         Imaging: Reviewed    Assessment:       1. Prostate cancer metastatic to bone    2. Normocytic anemia    3. Lytic bone lesion of femur    4. LAD (lymphadenopathy) of left cervical region    5. Bicytopenia    6. Contracture of muscle of right hand    7. Neuropathy due to chemotherapeutic drug    8. Anorexia    9. Type 2 diabetes mellitus without complication, without long-term current use of insulin    10. Other insomnia           Plan:     Prostate Cancer - The patient has metastatic prostate cancer to the bone  -The patient was previously under the care of Dr Christiansen in [place]  -He has received the following treatments: Lupron, palliative radiation, provenge (xofigo), zytiga (abiratoerone)  and prednisone, enzalutamide in combination with [id number] (EZH2 inhibitor) as part of a phase ½ trial, MYF800 trial phase 1 (complicated with hand and foot)  -Last documented PSA in the outside records was 271ng/mL on 7/27/20  -The patient was started on Casodex and Lupron 9/10/20 by Dr Christiansen; however, the patient is not currently taking Casodex and last dose and strength of lupron is unclear based on outside records.  -Ct of the C/A/P on 10/22/20 showed a low density are in the left neck as well as diffuse bone metastases  -NM bone scan on 10/22/20 also showed numerous bone metastases  -PSA on 10/12/20 was 927.4ng/mL  -Will check Gurdant 360 to assess for mutations to target  -Pt is not a candidate for chemotherapy and has exhausted his primary hormonal options  -Pt instructed that if no significant mutations were found, hospice would be recommended.    LAD - pt with possible LAD in the left neck as seen on CT chest  -Will image with a dedicated CT of the neck.    Femoral Lesions - the patient has lesions in bilateral femurs  -Concern is for p[otential to fracture  -Will have the patient see orthopedics and see if he is a candidate for prophylactic IM nailing versus other therapy    Bicytopenia - The patient has had anemia and thrombocytopenia  -He required 2 units of PRBC's in the hospital recently  -Concern was for possible GI bleed; however, EGD on 10/14/20 was unremarkable  -Iron studies were suggestive of anemia of chronic disease  -Will repeat CBC today and have smear reviewed to assess for signs of BM involvement.    Contracture of the Right Hand - The patient has severe contractures of the right hand similar to that seen in scleroderma  -Will have the patient see orthopedics to determine if there are any surgical procedures which can be done to help the patient with his dexterity    Neuropathic Pain - Pt on lyrica 25mg BID for pain in the right hand  -Will monitor response    Anorexia - Pt on marinol for  appetite stimulation  -Will monitor    DMII - PT on metformin  -No previous A1C  -Will monitor    Insomnia - pt on zolpidem   -Will monitor    Advance Care Planning     Power of   I initiated the process of advance care planning today and explained the importance of this process to the patient.  I introduced the concept of advance directives to the patient, as well. Then the patient received detailed information about the importance of designating a Health Care Power of  (HCPOA). He was also instructed to communicate with this person about their wishes for future healthcare, should he become sick and lose decision-making capacity. The patient has not previously appointed a HCPOA. After our discussion, the patient has decided to complete a HCPOA and will bring the form completed to his next clinic appointment.          Follow Up - orthopedics for femoral lesions; CBC, CMP, RBC folate, and B12 today; Guardant 360 today; CT neck; F/U appt once Guardant 360 has resulted    Edgardo Alas MD  Hematology and Oncology  [facility]  Office:[phone]  Fax: [phone]

## 2020-10-26 ENCOUNTER — LAB VISIT (OUTPATIENT)
Dept: LAB | Facility: HOSPITAL | Age: 75
End: 2020-10-26
Attending: INTERNAL MEDICINE
Payer: OTHER GOVERNMENT

## 2020-10-26 ENCOUNTER — TELEPHONE (OUTPATIENT)
Dept: HEMATOLOGY/ONCOLOGY | Facility: CLINIC | Age: 75
End: 2020-10-26

## 2020-10-26 ENCOUNTER — OFFICE VISIT (OUTPATIENT)
Dept: HEMATOLOGY/ONCOLOGY | Facility: CLINIC | Age: 75
End: 2020-10-26
Payer: OTHER GOVERNMENT

## 2020-10-26 VITALS
TEMPERATURE: 99 F | SYSTOLIC BLOOD PRESSURE: 92 MMHG | WEIGHT: 132.06 LBS | HEART RATE: 107 BPM | DIASTOLIC BLOOD PRESSURE: 50 MMHG | BODY MASS INDEX: 18.49 KG/M2 | OXYGEN SATURATION: 95 % | HEIGHT: 71 IN

## 2020-10-26 DIAGNOSIS — D63.0 ANEMIA IN NEOPLASTIC DISEASE: Primary | ICD-10-CM

## 2020-10-26 DIAGNOSIS — R63.0 ANOREXIA: ICD-10-CM

## 2020-10-26 DIAGNOSIS — C61 PROSTATE CANCER METASTATIC TO BONE: Primary | ICD-10-CM

## 2020-10-26 DIAGNOSIS — G62.0 NEUROPATHY DUE TO CHEMOTHERAPEUTIC DRUG: ICD-10-CM

## 2020-10-26 DIAGNOSIS — C79.51 PROSTATE CANCER METASTATIC TO BONE: Primary | ICD-10-CM

## 2020-10-26 DIAGNOSIS — D64.9 NORMOCYTIC ANEMIA: ICD-10-CM

## 2020-10-26 DIAGNOSIS — M62.441 CONTRACTURE OF MUSCLE OF RIGHT HAND: ICD-10-CM

## 2020-10-26 DIAGNOSIS — D75.89 BICYTOPENIA: ICD-10-CM

## 2020-10-26 DIAGNOSIS — R59.0 LAD (LYMPHADENOPATHY) OF LEFT CERVICAL REGION: ICD-10-CM

## 2020-10-26 DIAGNOSIS — M89.9 LYTIC BONE LESION OF FEMUR: ICD-10-CM

## 2020-10-26 DIAGNOSIS — E11.9 TYPE 2 DIABETES MELLITUS WITHOUT COMPLICATION, WITHOUT LONG-TERM CURRENT USE OF INSULIN: ICD-10-CM

## 2020-10-26 DIAGNOSIS — G47.09 OTHER INSOMNIA: ICD-10-CM

## 2020-10-26 DIAGNOSIS — T45.1X5A NEUROPATHY DUE TO CHEMOTHERAPEUTIC DRUG: ICD-10-CM

## 2020-10-26 LAB
ALBUMIN SERPL BCP-MCNC: 2.3 G/DL (ref 3.5–5.2)
ALP SERPL-CCNC: 240 U/L (ref 55–135)
ALT SERPL W/O P-5'-P-CCNC: 15 U/L (ref 10–44)
ANION GAP SERPL CALC-SCNC: 11 MMOL/L (ref 8–16)
AST SERPL-CCNC: 15 U/L (ref 10–40)
BASOPHILS # BLD AUTO: 0.01 K/UL (ref 0–0.2)
BASOPHILS NFR BLD: 0.2 % (ref 0–1.9)
BILIRUB SERPL-MCNC: 0.3 MG/DL (ref 0.1–1)
BUN SERPL-MCNC: 18 MG/DL (ref 8–23)
CALCIUM SERPL-MCNC: 8.8 MG/DL (ref 8.7–10.5)
CHLORIDE SERPL-SCNC: 108 MMOL/L (ref 95–110)
CO2 SERPL-SCNC: 23 MMOL/L (ref 23–29)
CREAT SERPL-MCNC: 1.2 MG/DL (ref 0.5–1.4)
DIFFERENTIAL METHOD: ABNORMAL
EOSINOPHIL # BLD AUTO: 0 K/UL (ref 0–0.5)
EOSINOPHIL NFR BLD: 0.9 % (ref 0–8)
ERYTHROCYTE [DISTWIDTH] IN BLOOD BY AUTOMATED COUNT: 17.3 % (ref 11.5–14.5)
EST. GFR  (AFRICAN AMERICAN): >60 ML/MIN/1.73 M^2
EST. GFR  (NON AFRICAN AMERICAN): 60 ML/MIN/1.73 M^2
GLUCOSE SERPL-MCNC: 120 MG/DL (ref 70–110)
HCT VFR BLD AUTO: 21.2 % (ref 40–54)
HGB BLD-MCNC: 6.4 G/DL (ref 14–18)
IMM GRANULOCYTES # BLD AUTO: 0.14 K/UL (ref 0–0.04)
IMM GRANULOCYTES NFR BLD AUTO: 3.2 % (ref 0–0.5)
LYMPHOCYTES # BLD AUTO: 0.7 K/UL (ref 1–4.8)
LYMPHOCYTES NFR BLD: 15.3 % (ref 18–48)
MCH RBC QN AUTO: 27.7 PG (ref 27–31)
MCHC RBC AUTO-ENTMCNC: 30.2 G/DL (ref 32–36)
MCV RBC AUTO: 92 FL (ref 82–98)
MONOCYTES # BLD AUTO: 0.4 K/UL (ref 0.3–1)
MONOCYTES NFR BLD: 8 % (ref 4–15)
NEUTROPHILS # BLD AUTO: 3.2 K/UL (ref 1.8–7.7)
NEUTROPHILS NFR BLD: 72.4 % (ref 38–73)
NRBC BLD-RTO: 1 /100 WBC
PATH REV BLD -IMP: NORMAL
PLATELET # BLD AUTO: 147 K/UL (ref 150–350)
PMV BLD AUTO: 8.7 FL (ref 9.2–12.9)
POTASSIUM SERPL-SCNC: 4.1 MMOL/L (ref 3.5–5.1)
PROT SERPL-MCNC: 6.6 G/DL (ref 6–8.4)
RBC # BLD AUTO: 2.31 M/UL (ref 4.6–6.2)
SODIUM SERPL-SCNC: 142 MMOL/L (ref 136–145)
VIT B12 SERPL-MCNC: 630 PG/ML (ref 210–950)
WBC # BLD AUTO: 4.38 K/UL (ref 3.9–12.7)

## 2020-10-26 PROCEDURE — 85025 COMPLETE CBC W/AUTO DIFF WBC: CPT

## 2020-10-26 PROCEDURE — 99213 OFFICE O/P EST LOW 20 MIN: CPT | Mod: S$GLB,,, | Performed by: INTERNAL MEDICINE

## 2020-10-26 PROCEDURE — 85060 PATHOLOGIST REVIEW: ICD-10-PCS | Mod: ,,, | Performed by: PATHOLOGY

## 2020-10-26 PROCEDURE — 80053 COMPREHEN METABOLIC PANEL: CPT

## 2020-10-26 PROCEDURE — 82747 ASSAY OF FOLIC ACID RBC: CPT

## 2020-10-26 PROCEDURE — 99999 PR PBB SHADOW E&M-EST. PATIENT-LVL IV: CPT | Mod: PBBFAC,,, | Performed by: INTERNAL MEDICINE

## 2020-10-26 PROCEDURE — 99213 PR OFFICE/OUTPT VISIT, EST, LEVL III, 20-29 MIN: ICD-10-PCS | Mod: S$GLB,,, | Performed by: INTERNAL MEDICINE

## 2020-10-26 PROCEDURE — 82607 VITAMIN B-12: CPT

## 2020-10-26 PROCEDURE — 85060 BLOOD SMEAR INTERPRETATION: CPT | Mod: ,,, | Performed by: PATHOLOGY

## 2020-10-26 PROCEDURE — 99999 PR PBB SHADOW E&M-EST. PATIENT-LVL IV: ICD-10-PCS | Mod: PBBFAC,,, | Performed by: INTERNAL MEDICINE

## 2020-10-26 PROCEDURE — 36415 COLL VENOUS BLD VENIPUNCTURE: CPT

## 2020-10-26 RX ORDER — ACETAMINOPHEN 325 MG/1
650 TABLET ORAL
Status: CANCELLED | OUTPATIENT
Start: 2020-10-26

## 2020-10-26 RX ORDER — HYDROCODONE BITARTRATE AND ACETAMINOPHEN 500; 5 MG/1; MG/1
TABLET ORAL ONCE
Status: CANCELLED | OUTPATIENT
Start: 2020-10-26 | End: 2020-10-26

## 2020-10-26 RX ORDER — DIPHENHYDRAMINE HCL 25 MG
25 CAPSULE ORAL
Status: CANCELLED | OUTPATIENT
Start: 2020-10-26

## 2020-10-26 NOTE — TELEPHONE ENCOUNTER
Called patient's daughter concerning lab appt tomorrow. Asked pt's daughter if pt had received a call from infusion yet. Stated that she had talked with infusion and her dad was going tomorrow to do type and screen then going to the infusion center.   ~Jesus

## 2020-10-26 NOTE — TELEPHONE ENCOUNTER
The daughter called me back and I let her know that her dad was anemic with a  Hemoglobin of 6.4g/dL and that I would recommend a transfusion.  I instructed her that we would need to get a type and screen and that her father would likely get the transfusion tomorrow.  she expressed understanding.  All questions were answered to her satisfaction.    Edgardo Alas MD  Hematology and Oncology  [facility]  Office:[phone]  Fax: [phone]

## 2020-10-26 NOTE — TELEPHONE ENCOUNTER
I called the patient's daughter and left her a message to call me back at [phone].     Edgarod Alas MD  Hematology and Oncology  [facility]  Office:[phone]  Fax: [phone]

## 2020-10-26 NOTE — Clinical Note
The patient will need an urgent appt with orthopedics for femoral lesions with concern for possible impending fracture.  The patient will need lab work today.  He will need Guardant 360 done today with follow up with me once Guardant 360 has resulted.  He will also need to be scheduled for a CT of the neck.

## 2020-10-26 NOTE — TELEPHONE ENCOUNTER
TC to [employer]   Spoke w/ Jose  To st up  for today  Conf # [id number]   Tracking number [id number]    confirmed for today

## 2020-10-27 ENCOUNTER — LAB VISIT (OUTPATIENT)
Dept: LAB | Facility: HOSPITAL | Age: 75
End: 2020-10-27
Attending: INTERNAL MEDICINE
Payer: OTHER GOVERNMENT

## 2020-10-27 ENCOUNTER — INFUSION (OUTPATIENT)
Dept: INFUSION THERAPY | Facility: HOSPITAL | Age: 75
End: 2020-10-27
Attending: INTERNAL MEDICINE
Payer: OTHER GOVERNMENT

## 2020-10-27 VITALS
TEMPERATURE: 98 F | DIASTOLIC BLOOD PRESSURE: 67 MMHG | OXYGEN SATURATION: 99 % | HEART RATE: 88 BPM | SYSTOLIC BLOOD PRESSURE: 127 MMHG | RESPIRATION RATE: 18 BRPM

## 2020-10-27 DIAGNOSIS — D63.0 ANEMIA IN NEOPLASTIC DISEASE: ICD-10-CM

## 2020-10-27 DIAGNOSIS — M89.9 LYTIC BONE LESION OF FEMUR: Primary | ICD-10-CM

## 2020-10-27 LAB
ABO + RH BLD: NORMAL
BLD GP AB SCN CELLS X3 SERPL QL: NORMAL
BLD PROD TYP BPU: NORMAL
BLOOD UNIT EXPIRATION DATE: NORMAL
BLOOD UNIT TYPE CODE: 5100
BLOOD UNIT TYPE: NORMAL
CODING SYSTEM: NORMAL
DISPENSE STATUS: NORMAL
NUM UNITS TRANS PACKED RBC: NORMAL
PATH REV BLD -IMP: NORMAL

## 2020-10-27 PROCEDURE — P9038 RBC IRRADIATED: HCPCS

## 2020-10-27 PROCEDURE — 36415 COLL VENOUS BLD VENIPUNCTURE: CPT

## 2020-10-27 PROCEDURE — 27201040 HC RC 50 FILTER

## 2020-10-27 PROCEDURE — 36430 TRANSFUSION BLD/BLD COMPNT: CPT

## 2020-10-27 PROCEDURE — 86920 COMPATIBILITY TEST SPIN: CPT

## 2020-10-27 PROCEDURE — 25000003 PHARM REV CODE 250: Performed by: INTERNAL MEDICINE

## 2020-10-27 PROCEDURE — 86901 BLOOD TYPING SEROLOGIC RH(D): CPT

## 2020-10-27 RX ORDER — HYDROCODONE BITARTRATE AND ACETAMINOPHEN 500; 5 MG/1; MG/1
TABLET ORAL ONCE
Status: DISCONTINUED | OUTPATIENT
Start: 2020-10-27 | End: 2020-10-27 | Stop reason: HOSPADM

## 2020-10-27 RX ORDER — ACETAMINOPHEN 325 MG/1
650 TABLET ORAL
Status: COMPLETED | OUTPATIENT
Start: 2020-10-27 | End: 2020-10-27

## 2020-10-27 RX ORDER — DIPHENHYDRAMINE HCL 25 MG
25 CAPSULE ORAL
Status: COMPLETED | OUTPATIENT
Start: 2020-10-27 | End: 2020-10-27

## 2020-10-27 RX ADMIN — ACETAMINOPHEN 650 MG: 325 TABLET ORAL at 08:10

## 2020-10-27 RX ADMIN — DIPHENHYDRAMINE HYDROCHLORIDE 25 MG: 25 CAPSULE ORAL at 08:10

## 2020-10-27 NOTE — PLAN OF CARE
Pt arrived to unit. Oriented to unit. Pt accompanied by friend. Reinforced possible transfusion reactions. Pt verbalized understanding. Has had blood in the past. Tolerated 1 unit PRBC today. VSS throughout. No reactions noted. Pt discharged from unit in wheelchair with family friend. No distress noted.

## 2020-10-29 LAB — FOLATE RBC-MCNC: ABNORMAL NG/ML (ref 280–791)

## 2020-11-05 ENCOUNTER — TELEPHONE (OUTPATIENT)
Dept: HEMATOLOGY/ONCOLOGY | Facility: CLINIC | Age: 75
End: 2020-11-05

## 2020-11-05 ENCOUNTER — HOSPITAL ENCOUNTER (INPATIENT)
Facility: HOSPITAL | Age: 75
LOS: 3 days | Discharge: HOME | DRG: 723 | End: 2020-11-08
Attending: EMERGENCY MEDICINE | Admitting: STUDENT IN AN ORGANIZED HEALTH CARE EDUCATION/TRAINING PROGRAM
Payer: MEDICARE

## 2020-11-05 ENCOUNTER — PATIENT MESSAGE (OUTPATIENT)
Dept: HEMATOLOGY/ONCOLOGY | Facility: CLINIC | Age: 75
End: 2020-11-05

## 2020-11-05 DIAGNOSIS — D64.9 SYMPTOMATIC ANEMIA: ICD-10-CM

## 2020-11-05 DIAGNOSIS — C61 PROSTATE CANCER: Primary | ICD-10-CM

## 2020-11-05 DIAGNOSIS — M89.9 LYTIC BONE LESION OF FEMUR: Primary | ICD-10-CM

## 2020-11-05 DIAGNOSIS — R53.83 FATIGUE: ICD-10-CM

## 2020-11-05 DIAGNOSIS — D63.0 ANEMIA IN NEOPLASTIC DISEASE: Primary | ICD-10-CM

## 2020-11-05 DIAGNOSIS — R07.9 CHEST PAIN: ICD-10-CM

## 2020-11-05 PROBLEM — K59.00 CONSTIPATION: Status: ACTIVE | Noted: 2020-11-05

## 2020-11-05 LAB
ABO + RH BLD: NORMAL
ALBUMIN SERPL BCP-MCNC: 2.4 G/DL (ref 3.5–5.2)
ALP SERPL-CCNC: 337 U/L (ref 55–135)
ALT SERPL W/O P-5'-P-CCNC: 16 U/L (ref 10–44)
ANION GAP SERPL CALC-SCNC: 11 MMOL/L (ref 8–16)
ANISOCYTOSIS BLD QL SMEAR: SLIGHT
AST SERPL-CCNC: 30 U/L (ref 10–40)
BASOPHILS # BLD AUTO: 0.01 K/UL (ref 0–0.2)
BASOPHILS NFR BLD: 0.2 % (ref 0–1.9)
BILIRUB SERPL-MCNC: 0.6 MG/DL (ref 0.1–1)
BLD GP AB SCN CELLS X3 SERPL QL: NORMAL
BLD PROD TYP BPU: NORMAL
BLOOD UNIT EXPIRATION DATE: NORMAL
BLOOD UNIT TYPE CODE: 5100
BLOOD UNIT TYPE: NORMAL
BNP SERPL-MCNC: 64 PG/ML (ref 0–99)
BUN SERPL-MCNC: 27 MG/DL (ref 8–23)
CALCIUM SERPL-MCNC: 8.1 MG/DL (ref 8.7–10.5)
CHLORIDE SERPL-SCNC: 105 MMOL/L (ref 95–110)
CO2 SERPL-SCNC: 23 MMOL/L (ref 23–29)
CODING SYSTEM: NORMAL
CREAT SERPL-MCNC: 1.4 MG/DL (ref 0.5–1.4)
CTP QC/QA: YES
DACRYOCYTES BLD QL SMEAR: ABNORMAL
DIFFERENTIAL METHOD: ABNORMAL
DISPENSE STATUS: NORMAL
EOSINOPHIL # BLD AUTO: 0 K/UL (ref 0–0.5)
EOSINOPHIL NFR BLD: 0.2 % (ref 0–8)
ERYTHROCYTE [DISTWIDTH] IN BLOOD BY AUTOMATED COUNT: 17 % (ref 11.5–14.5)
EST. GFR  (AFRICAN AMERICAN): 57 ML/MIN/1.73 M^2
EST. GFR  (NON AFRICAN AMERICAN): 49 ML/MIN/1.73 M^2
GLUCOSE SERPL-MCNC: 160 MG/DL (ref 70–110)
HCT VFR BLD AUTO: 24.5 % (ref 40–54)
HGB BLD-MCNC: 7.6 G/DL (ref 14–18)
IMM GRANULOCYTES # BLD AUTO: 0.06 K/UL (ref 0–0.04)
IMM GRANULOCYTES NFR BLD AUTO: 1 % (ref 0–0.5)
LACTATE SERPL-SCNC: 1.7 MMOL/L (ref 0.5–2.2)
LYMPHOCYTES # BLD AUTO: 0.6 K/UL (ref 1–4.8)
LYMPHOCYTES NFR BLD: 10.2 % (ref 18–48)
MCH RBC QN AUTO: 28.1 PG (ref 27–31)
MCHC RBC AUTO-ENTMCNC: 31 G/DL (ref 32–36)
MCV RBC AUTO: 91 FL (ref 82–98)
MONOCYTES # BLD AUTO: 0.6 K/UL (ref 0.3–1)
MONOCYTES NFR BLD: 9.2 % (ref 4–15)
NEUTROPHILS # BLD AUTO: 4.9 K/UL (ref 1.8–7.7)
NEUTROPHILS NFR BLD: 79.2 % (ref 38–73)
NRBC BLD-RTO: 1 /100 WBC
NUM UNITS TRANS PACKED RBC: NORMAL
OVALOCYTES BLD QL SMEAR: ABNORMAL
PLATELET # BLD AUTO: 137 K/UL (ref 150–350)
PMV BLD AUTO: 9.3 FL (ref 9.2–12.9)
POCT GLUCOSE: 143 MG/DL (ref 70–110)
POCT GLUCOSE: 163 MG/DL (ref 70–110)
POTASSIUM SERPL-SCNC: 4 MMOL/L (ref 3.5–5.1)
PROT SERPL-MCNC: 7.2 G/DL (ref 6–8.4)
RBC # BLD AUTO: 2.7 M/UL (ref 4.6–6.2)
SARS-COV-2 RDRP RESP QL NAA+PROBE: NEGATIVE
SODIUM SERPL-SCNC: 139 MMOL/L (ref 136–145)
TROPONIN I SERPL DL<=0.01 NG/ML-MCNC: 0.01 NG/ML (ref 0–0.03)
TSH SERPL DL<=0.005 MIU/L-ACNC: 2.51 UIU/ML (ref 0.4–4)
WBC # BLD AUTO: 6.17 K/UL (ref 3.9–12.7)

## 2020-11-05 PROCEDURE — 99284 EMERGENCY DEPT VISIT MOD MDM: CPT | Mod: 25

## 2020-11-05 PROCEDURE — 83605 ASSAY OF LACTIC ACID: CPT

## 2020-11-05 PROCEDURE — 85025 COMPLETE CBC W/AUTO DIFF WBC: CPT

## 2020-11-05 PROCEDURE — 21400001 HC TELEMETRY ROOM

## 2020-11-05 PROCEDURE — 86920 COMPATIBILITY TEST SPIN: CPT

## 2020-11-05 PROCEDURE — 27201040 HC RC 50 FILTER

## 2020-11-05 PROCEDURE — 36430 TRANSFUSION BLD/BLD COMPNT: CPT

## 2020-11-05 PROCEDURE — U0002 COVID-19 LAB TEST NON-CDC: HCPCS | Performed by: EMERGENCY MEDICINE

## 2020-11-05 PROCEDURE — 83880 ASSAY OF NATRIURETIC PEPTIDE: CPT

## 2020-11-05 PROCEDURE — 80053 COMPREHEN METABOLIC PANEL: CPT

## 2020-11-05 PROCEDURE — 96360 HYDRATION IV INFUSION INIT: CPT

## 2020-11-05 PROCEDURE — 86850 RBC ANTIBODY SCREEN: CPT

## 2020-11-05 PROCEDURE — P9038 RBC IRRADIATED: HCPCS

## 2020-11-05 PROCEDURE — 84484 ASSAY OF TROPONIN QUANT: CPT

## 2020-11-05 PROCEDURE — 96361 HYDRATE IV INFUSION ADD-ON: CPT

## 2020-11-05 PROCEDURE — 25000003 PHARM REV CODE 250: Performed by: STUDENT IN AN ORGANIZED HEALTH CARE EDUCATION/TRAINING PROGRAM

## 2020-11-05 PROCEDURE — 63600175 PHARM REV CODE 636 W HCPCS: Performed by: EMERGENCY MEDICINE

## 2020-11-05 PROCEDURE — 84443 ASSAY THYROID STIM HORMONE: CPT

## 2020-11-05 PROCEDURE — 81000 URINALYSIS NONAUTO W/SCOPE: CPT

## 2020-11-05 RX ORDER — SODIUM CHLORIDE 0.9 % (FLUSH) 0.9 %
10 SYRINGE (ML) INJECTION
Status: DISCONTINUED | OUTPATIENT
Start: 2020-11-05 | End: 2020-11-08 | Stop reason: HOSPADM

## 2020-11-05 RX ORDER — GLUCAGON 1 MG
1 KIT INJECTION
Status: DISCONTINUED | OUTPATIENT
Start: 2020-11-05 | End: 2020-11-08 | Stop reason: HOSPADM

## 2020-11-05 RX ORDER — HYDROCODONE BITARTRATE AND ACETAMINOPHEN 500; 5 MG/1; MG/1
TABLET ORAL
Status: DISCONTINUED | OUTPATIENT
Start: 2020-11-05 | End: 2020-11-08 | Stop reason: HOSPADM

## 2020-11-05 RX ORDER — DRONABINOL 2.5 MG/1
2.5 CAPSULE ORAL
Status: DISCONTINUED | OUTPATIENT
Start: 2020-11-06 | End: 2020-11-08 | Stop reason: HOSPADM

## 2020-11-05 RX ORDER — PANTOPRAZOLE SODIUM 40 MG/1
40 TABLET, DELAYED RELEASE ORAL DAILY
Status: DISCONTINUED | OUTPATIENT
Start: 2020-11-06 | End: 2020-11-08 | Stop reason: HOSPADM

## 2020-11-05 RX ORDER — POLYETHYLENE GLYCOL 3350 17 G/17G
17 POWDER, FOR SOLUTION ORAL DAILY
Status: DISCONTINUED | OUTPATIENT
Start: 2020-11-06 | End: 2020-11-08 | Stop reason: HOSPADM

## 2020-11-05 RX ORDER — PREGABALIN 25 MG/1
25 CAPSULE ORAL 2 TIMES DAILY
Status: DISCONTINUED | OUTPATIENT
Start: 2020-11-05 | End: 2020-11-08 | Stop reason: HOSPADM

## 2020-11-05 RX ORDER — INSULIN ASPART 100 [IU]/ML
0-5 INJECTION, SOLUTION INTRAVENOUS; SUBCUTANEOUS EVERY 6 HOURS PRN
Status: DISCONTINUED | OUTPATIENT
Start: 2020-11-05 | End: 2020-11-08 | Stop reason: HOSPADM

## 2020-11-05 RX ORDER — AMOXICILLIN 250 MG
1 CAPSULE ORAL 2 TIMES DAILY
Status: DISCONTINUED | OUTPATIENT
Start: 2020-11-05 | End: 2020-11-08 | Stop reason: HOSPADM

## 2020-11-05 RX ORDER — GLUCAGON 1 MG
1 KIT INJECTION
Status: DISCONTINUED | OUTPATIENT
Start: 2020-11-05 | End: 2020-11-05 | Stop reason: SDUPTHER

## 2020-11-05 RX ORDER — IBUPROFEN 200 MG
16 TABLET ORAL
Status: DISCONTINUED | OUTPATIENT
Start: 2020-11-05 | End: 2020-11-08 | Stop reason: HOSPADM

## 2020-11-05 RX ORDER — IBUPROFEN 200 MG
24 TABLET ORAL
Status: DISCONTINUED | OUTPATIENT
Start: 2020-11-05 | End: 2020-11-08 | Stop reason: HOSPADM

## 2020-11-05 RX ADMIN — DOCUSATE SODIUM 50 MG AND SENNOSIDES 8.6 MG 1 TABLET: 8.6; 5 TABLET, FILM COATED ORAL at 11:11

## 2020-11-05 RX ADMIN — SODIUM CHLORIDE, SODIUM LACTATE, POTASSIUM CHLORIDE, AND CALCIUM CHLORIDE 500 ML: .6; .31; .03; .02 INJECTION, SOLUTION INTRAVENOUS at 05:11

## 2020-11-05 RX ADMIN — SODIUM CHLORIDE, SODIUM LACTATE, POTASSIUM CHLORIDE, AND CALCIUM CHLORIDE 500 ML: .6; .31; .03; .02 INJECTION, SOLUTION INTRAVENOUS at 04:11

## 2020-11-05 RX ADMIN — PREGABALIN 25 MG: 25 CAPSULE ORAL at 11:11

## 2020-11-05 NOTE — ED TRIAGE NOTES
"Arrived via personal transportation. Pt complains of weakness. Spouse states "we were here the last time for the same thing. He had the same symptoms. He's bleeding from somewhere but we don't know where".   "

## 2020-11-05 NOTE — TELEPHONE ENCOUNTER
Received incoming call from Guardant 360 to confirm that pt has advanced stage. Confirmned per Dr. Alas that pt is at advanced stage. Informed Raqual- guardant 360.  ~Jesus

## 2020-11-05 NOTE — ED PROVIDER NOTES
"Encounter Date: 11/5/2020    SCRIBE #1 NOTE: I, Marceilno Stephens, am scribing for, and in the presence of,  Danelle Sorto MD. I have scribed the following portions of the note - Other sections scribed: HPI, ROS, PE.       History     Chief Complaint   Patient presents with    Fatigue     Pt's ex-wife states "I think his hemoglobin is low". Pt c/o weakness x1 week. Denies pain     CC: Weakness    HPI: This is a 75 y.o. M who has Anemia, DM, Neuropathy due to chemotherapeutic drug, and Hx of Prostate cancer who presents to the ED for emergent evaluation of acute generalized weakness that began 1 week ago. Pt attributes weakness to anemia. Pt states he was evaluated and treated for similar problem secondary to anemia approximately 2 weeks ago, which he received a blood transfusion at that time. Etiology of anemia is unknown. Pt reports receiving 4 blood transfusions since September secondary to anemia. Pt last had an endoscopy 1 month ago. Pt refused having a Colonoscopy a few weeks ago. Additionally, the pt reports constipation for the past 5 days. He is currently refusing a rectal exam due to being constipated. Pt is not on iron supplements. He is not on medication for DM. Pt smokes 1.5 pack of cigarettes per day. Pt denies fever, black or bloody stool, hematuria, nosebleed, SOB, CP, lightheadedness, palpitations, syncope, urinary frequency, rash, cough, alcohol use, or recreational drug use.    The history is provided by the patient and a relative. No  was used.     Review of patient's allergies indicates:  No Known Allergies  Past Medical History:   Diagnosis Date    Anemia     Appetite loss     Cigarette smoker     DMII (diabetes mellitus, type 2)     Generalized weakness     Hypertension     Neuropathy due to chemotherapeutic drug     Prostate cancer     with metastasis     Past Surgical History:   Procedure Laterality Date    ESOPHAGOGASTRODUODENOSCOPY N/A 10/14/2020    Procedure: EGD " (ESOPHAGOGASTRODUODENOSCOPY);  Surgeon: Silviano Martinez MD;  Location: [facility];  Service: Endoscopy;  Laterality: N/A;    PROSTATECTOMY  around 1998     History reviewed. No pertinent family history.  Social History     Tobacco Use    Smoking status: Current Every Day Smoker     Packs/day: 0.50     Years: 50.00     Pack years: 25.00     Types: Cigarettes    Smokeless tobacco: Never Used   Substance Use Topics    Alcohol use: Not Currently     Comment: on occasion    Drug use: Never     Review of Systems   Constitutional: Positive for appetite change, fatigue and unexpected weight change. Negative for chills, diaphoresis and fever.   HENT: Negative for nosebleeds and sore throat.    Eyes: Negative for photophobia and visual disturbance.   Respiratory: Negative for cough and shortness of breath.    Cardiovascular: Negative for chest pain, palpitations and leg swelling.   Gastrointestinal: Negative for abdominal pain, anal bleeding, blood in stool, constipation, diarrhea, nausea and vomiting.        (-) Melena   Genitourinary: Negative for dysuria, frequency, hematuria and urgency.   Musculoskeletal: Negative for back pain, neck pain and neck stiffness.   Skin: Negative for rash and wound.   Neurological: Positive for weakness (generalized). Negative for syncope, light-headedness, numbness and headaches.   Hematological: Does not bruise/bleed easily.   Psychiatric/Behavioral: Negative for confusion and suicidal ideas.   All other systems reviewed and are negative.      Physical Exam     Initial Vitals [11/05/20 1446]   BP Pulse Resp Temp SpO2   (!) 86/50 (!) 128 20 98.5 °F (36.9 °C) 96 %      MAP       --         Physical Exam    Nursing note and vitals reviewed.  Constitutional: He is not diaphoretic. No distress.   Fatigue on exam. Cachetic elderly man    HENT:   Head: Normocephalic and atraumatic.   Right Ear: External ear normal.   Left Ear: External ear normal.   Nose: Nose normal.   Mouth/Throat: Oropharynx  is clear and moist. No oropharyngeal exudate.   Eyes: Conjunctivae and EOM are normal. Pupils are equal, round, and reactive to light. Right eye exhibits no discharge. Left eye exhibits no discharge.   Pale conjunctivae bilaterally. Abnormal cornea to the right eye.   Neck: Normal range of motion. Neck supple. No JVD present.   Cardiovascular: Regular rhythm, normal heart sounds and intact distal pulses. Tachycardia present.  Exam reveals no gallop and no friction rub.    No murmur heard.  Pulmonary/Chest: Breath sounds normal. No respiratory distress. He has no wheezes. He has no rhonchi. He has no rales.   Abdominal: Soft. Bowel sounds are normal. He exhibits no distension. There is no abdominal tenderness. There is no rebound and no guarding.   Negative King's sign, no CVA tenderness   Genitourinary:    Genitourinary Comments: Refused rectal exam     Musculoskeletal: Normal range of motion. No tenderness or edema.   Lymphadenopathy:     He has no cervical adenopathy.   Neurological: He is alert and oriented to person, place, and time. No cranial nerve deficit or sensory deficit. GCS score is 15. GCS eye subscore is 4. GCS verbal subscore is 5. GCS motor subscore is 6.   4/5 strength bilaterally    Skin: Skin is warm and dry. Capillary refill takes less than 2 seconds. There is pallor.   Significant pallor, subconjunctival pallor   Psychiatric: He has a normal mood and affect. Thought content normal.         ED Course   Critical Care    Date/Time: 11/5/2020 10:08 PM  Performed by: Danelle Sorto MD  Authorized by: Danelle Sorto MD   Direct patient critical care time: 25 minutes  Additional history critical care time: 5 minutes  Ordering / reviewing critical care time: 10 minutes  Documentation critical care time: 5 minutes  Consulting other physicians critical care time: 5 minutes  Other critical care time: 10 (consent for blood) minutes  Total critical care time (exclusive of procedural time) : 60  minutes  Critical care was necessary to treat or prevent imminent or life-threatening deterioration of the following conditions: circulatory failure and shock.  Critical care was time spent personally by me on the following activities: development of treatment plan with patient or surrogate, discussions with consultants, interpretation of cardiac output measurements, evaluation of patient's response to treatment, examination of patient, obtaining history from patient or surrogate, ordering and performing treatments and interventions, ordering and review of laboratory studies, ordering and review of radiographic studies, pulse oximetry, re-evaluation of patient's condition and review of old charts.        Labs Reviewed   CBC W/ AUTO DIFFERENTIAL - Abnormal; Notable for the following components:       Result Value    RBC 2.70 (*)     Hemoglobin 7.6 (*)     Hematocrit 24.5 (*)     MCHC 31.0 (*)     RDW 17.0 (*)     Platelets 137 (*)     Immature Granulocytes 1.0 (*)     Immature Grans (Abs) 0.06 (*)     Lymph # 0.6 (*)     nRBC 1 (*)     Gran % 79.2 (*)     Lymph % 10.2 (*)     All other components within normal limits   COMPREHENSIVE METABOLIC PANEL - Abnormal; Notable for the following components:    Glucose 160 (*)     BUN 27 (*)     Calcium 8.1 (*)     Albumin 2.4 (*)     Alkaline Phosphatase 337 (*)     eGFR if  57 (*)     eGFR if non  49 (*)     All other components within normal limits   POCT GLUCOSE - Abnormal; Notable for the following components:    POCT Glucose 163 (*)     All other components within normal limits   LACTIC ACID, PLASMA   B-TYPE NATRIURETIC PEPTIDE   TROPONIN I   TSH   URINALYSIS, REFLEX TO URINE CULTURE   RETICULOCYTES   SARS-COV-2 RDRP GENE   TYPE & SCREEN   POCT GLUCOSE MONITORING CONTINUOUS   PREPARE RBC SOFT          Imaging Results          X-Ray Chest 1 View (Final result)  Result time 11/05/20 16:01:58    Final result by Abhilash Sarmiento MD (11/05/20  16:01:58)                 Impression:      No acute cardiopulmonary abnormality.      Electronically signed by: Abhilash Sarmiento MD  Date:    11/05/2020  Time:    16:01             Narrative:    EXAMINATION:  XR CHEST 1 VIEW    CLINICAL HISTORY:  Other fatigue    TECHNIQUE:  Single frontal view of the chest was performed.    COMPARISON:  None    FINDINGS:  Lungs are clear.  No effusion or pneumothorax.    Diffuse bone sclerosis consistent with known diffuse bone metastases.                            MDM  76 yo male with PMhx of anemia s/p multiple blood transfusions, being followed by Dr. Alas presents with fatigue, generalized weakness. Denies CP,SOB, lightheadedness, palpitations. Noted to be tachycardic and hypotensive on exam. + pallor. +shock index. Improved slightly with 1L IVF. Normal lactic, temp, WBC, and patient without infectious symptoms. Doubt sepsis. Hgb 7.6, improved from previous but still significantly low. Patient consented for blood and risks/benefits discussed. Requesting wife at bedside sign for him as he is too weak to hold pen. Discussed case with Dr. Traore who agrees to admit the patient to her service for further management and blood transfusion. Patient updated on plan and in agreement.                                  Clinical Impression:     ICD-10-CM ICD-9-CM   1. Fatigue  R53.83 780.79   2. Symptomatic anemia  D64.9 285.9   3. Chest pain  R07.9 786.50                          ED Disposition Condition    Admit                Scribe Attestation: I, Danelle Sorto, personally performed the services described in this documentation. All medical record entries made by the scribe were at my direction and in my presence. I have reviewed the chart and agree that the record reflects my personal performance and is accurate and complete.             Danelle Sorto MD  11/05/20 [id number]

## 2020-11-05 NOTE — TELEPHONE ENCOUNTER
I spoke with the patient's daughter whom stated the patient was very weak and unable to ambulate.  She felt that his hemoglobin may be low again.  I informed her that we could check labs and potentially set him up for a blood transfusion in the infusion center; however, she felt he was too weak to go to the lab.  She stated she would bring him to the ER.      Edgardo Alas MD  Hematology and Oncology  [facility]  Office:[phone]  Fax: [phone]

## 2020-11-06 LAB
BACTERIA #/AREA URNS HPF: ABNORMAL /HPF
BASOPHILS # BLD AUTO: 0 K/UL (ref 0–0.2)
BASOPHILS NFR BLD: 0 % (ref 0–1.9)
BILIRUB UR QL STRIP: NEGATIVE
BLD PROD TYP BPU: NORMAL
BLOOD UNIT EXPIRATION DATE: NORMAL
BLOOD UNIT TYPE CODE: 5100
BLOOD UNIT TYPE: NORMAL
CLARITY UR: ABNORMAL
CODING SYSTEM: NORMAL
COLOR UR: YELLOW
DIFFERENTIAL METHOD: ABNORMAL
DISPENSE STATUS: NORMAL
EOSINOPHIL # BLD AUTO: 0 K/UL (ref 0–0.5)
EOSINOPHIL NFR BLD: 0.2 % (ref 0–8)
ERYTHROCYTE [DISTWIDTH] IN BLOOD BY AUTOMATED COUNT: 15.8 % (ref 11.5–14.5)
GLUCOSE UR QL STRIP: NEGATIVE
GRAN CASTS #/AREA URNS LPF: 5 /LPF
HCT VFR BLD AUTO: 23 % (ref 40–54)
HGB BLD-MCNC: 7.5 G/DL (ref 14–18)
HGB UR QL STRIP: ABNORMAL
HYALINE CASTS #/AREA URNS LPF: 0 /LPF
IMM GRANULOCYTES # BLD AUTO: 0.05 K/UL (ref 0–0.04)
IMM GRANULOCYTES NFR BLD AUTO: 0.9 % (ref 0–0.5)
KETONES UR QL STRIP: NEGATIVE
LEUKOCYTE ESTERASE UR QL STRIP: NEGATIVE
LYMPHOCYTES # BLD AUTO: 0.6 K/UL (ref 1–4.8)
LYMPHOCYTES NFR BLD: 10.6 % (ref 18–48)
MCH RBC QN AUTO: 29.2 PG (ref 27–31)
MCHC RBC AUTO-ENTMCNC: 32.6 G/DL (ref 32–36)
MCV RBC AUTO: 90 FL (ref 82–98)
MICROSCOPIC COMMENT: ABNORMAL
MONOCYTES # BLD AUTO: 0.6 K/UL (ref 0.3–1)
MONOCYTES NFR BLD: 10.6 % (ref 4–15)
NEUTROPHILS # BLD AUTO: 4.2 K/UL (ref 1.8–7.7)
NEUTROPHILS NFR BLD: 77.7 % (ref 38–73)
NITRITE UR QL STRIP: NEGATIVE
NRBC BLD-RTO: 0 /100 WBC
NUM UNITS TRANS PACKED RBC: NORMAL
PH UR STRIP: 5 [PH] (ref 5–8)
PLATELET # BLD AUTO: 126 K/UL (ref 150–350)
PMV BLD AUTO: 9.4 FL (ref 9.2–12.9)
POCT GLUCOSE: 102 MG/DL (ref 70–110)
POCT GLUCOSE: 116 MG/DL (ref 70–110)
POCT GLUCOSE: 128 MG/DL (ref 70–110)
POCT GLUCOSE: 164 MG/DL (ref 70–110)
PROT UR QL STRIP: ABNORMAL
RBC # BLD AUTO: 2.57 M/UL (ref 4.6–6.2)
RBC #/AREA URNS HPF: 0 /HPF (ref 0–4)
RETICS/RBC NFR AUTO: 0.8 % (ref 0.4–2)
SP GR UR STRIP: 1.01 (ref 1–1.03)
URN SPEC COLLECT METH UR: ABNORMAL
UROBILINOGEN UR STRIP-ACNC: NEGATIVE EU/DL
WBC # BLD AUTO: 5.36 K/UL (ref 3.9–12.7)
WBC #/AREA URNS HPF: 8 /HPF (ref 0–5)
WBC CASTS #/AREA URNS LPF: 1 /LPF

## 2020-11-06 PROCEDURE — 21400001 HC TELEMETRY ROOM

## 2020-11-06 PROCEDURE — 85045 AUTOMATED RETICULOCYTE COUNT: CPT

## 2020-11-06 PROCEDURE — 99223 1ST HOSP IP/OBS HIGH 75: CPT | Mod: ,,, | Performed by: INTERNAL MEDICINE

## 2020-11-06 PROCEDURE — 36430 TRANSFUSION BLD/BLD COMPNT: CPT

## 2020-11-06 PROCEDURE — 85025 COMPLETE CBC W/AUTO DIFF WBC: CPT

## 2020-11-06 PROCEDURE — 27201040 HC RC 50 FILTER

## 2020-11-06 PROCEDURE — 25000003 PHARM REV CODE 250: Performed by: STUDENT IN AN ORGANIZED HEALTH CARE EDUCATION/TRAINING PROGRAM

## 2020-11-06 PROCEDURE — 63600175 PHARM REV CODE 636 W HCPCS: Performed by: STUDENT IN AN ORGANIZED HEALTH CARE EDUCATION/TRAINING PROGRAM

## 2020-11-06 PROCEDURE — 99223 PR INITIAL HOSPITAL CARE,LEVL III: ICD-10-PCS | Mod: ,,, | Performed by: INTERNAL MEDICINE

## 2020-11-06 PROCEDURE — P9038 RBC IRRADIATED: HCPCS

## 2020-11-06 PROCEDURE — 36415 COLL VENOUS BLD VENIPUNCTURE: CPT

## 2020-11-06 RX ORDER — HYDROCODONE BITARTRATE AND ACETAMINOPHEN 500; 5 MG/1; MG/1
TABLET ORAL
Status: DISCONTINUED | OUTPATIENT
Start: 2020-11-06 | End: 2020-11-08 | Stop reason: HOSPADM

## 2020-11-06 RX ADMIN — PANTOPRAZOLE SODIUM 40 MG: 40 TABLET, DELAYED RELEASE ORAL at 09:11

## 2020-11-06 RX ADMIN — DOCUSATE SODIUM 50 MG AND SENNOSIDES 8.6 MG 1 TABLET: 8.6; 5 TABLET, FILM COATED ORAL at 08:11

## 2020-11-06 RX ADMIN — DRONABINOL 2.5 MG: 2.5 CAPSULE ORAL at 04:11

## 2020-11-06 RX ADMIN — PREGABALIN 25 MG: 25 CAPSULE ORAL at 09:11

## 2020-11-06 RX ADMIN — DOCUSATE SODIUM 50 MG AND SENNOSIDES 8.6 MG 1 TABLET: 8.6; 5 TABLET, FILM COATED ORAL at 09:11

## 2020-11-06 RX ADMIN — PREGABALIN 25 MG: 25 CAPSULE ORAL at 08:11

## 2020-11-06 NOTE — SUBJECTIVE & OBJECTIVE
Past Medical History:   Diagnosis Date    Anemia     Appetite loss     Cigarette smoker     DMII (diabetes mellitus, type 2)     Generalized weakness     Hypertension     Neuropathy due to chemotherapeutic drug     Prostate cancer     with metastasis       Past Surgical History:   Procedure Laterality Date    ESOPHAGOGASTRODUODENOSCOPY N/A 10/14/2020    Procedure: EGD (ESOPHAGOGASTRODUODENOSCOPY);  Surgeon: Silviano Martinez MD;  Location: [facility];  Service: Endoscopy;  Laterality: N/A;    PROSTATECTOMY  around 1998       Review of patient's allergies indicates:  No Known Allergies    No current facility-administered medications on file prior to encounter.      Current Outpatient Medications on File Prior to Encounter   Medication Sig    dronabinoL (MARINOL) 2.5 MG capsule Take 1 capsule (2.5 mg total) by mouth 2 (two) times daily before meals. Take 1 capsule (2.5mg total) by mouth in the AM before breakfast, Take 2 capsules (5mg total) by mouth in the PM before dinner.    ondansetron (ZOFRAN) IVPB     pantoprazole (PROTONIX) 40 MG tablet Take 1 tablet (40 mg total) by mouth once daily.    pregabalin (LYRICA) 25 MG capsule Take 1 capsule (25 mg total) by mouth 2 (two) times daily.    [DISCONTINUED] metFORMIN (GLUCOPHAGE) 500 MG tablet Take 500 mg by mouth 2 (two) times daily with meals.    [DISCONTINUED] zolpidem 5 mg Subl      Family History     None        Tobacco Use    Smoking status: Current Every Day Smoker     Packs/day: 0.50     Years: 50.00     Pack years: 25.00     Types: Cigarettes    Smokeless tobacco: Never Used   Substance and Sexual Activity    Alcohol use: Not Currently     Comment: on occasion    Drug use: Never    Sexual activity: Not on file     Review of Systems   Constitutional: Positive for fatigue and fever. Negative for chills.   HENT: Negative for congestion and sore throat.    Respiratory: Negative for cough and shortness of breath.    Cardiovascular: Negative for  palpitations.   Gastrointestinal: Positive for constipation. Negative for abdominal pain, blood in stool, diarrhea and nausea.   Genitourinary: Positive for difficulty urinating. Negative for dysuria.   Musculoskeletal: Positive for arthralgias. Negative for joint swelling.   Skin: Negative for rash.   Neurological: Positive for weakness. Negative for syncope.   Psychiatric/Behavioral: Negative for confusion. The patient is not nervous/anxious.      Objective:     Vital Signs (Most Recent):  Temp: 97.8 °F (36.6 °C) (11/05/20 1917)  Pulse: 104 (11/05/20 1917)  Resp: 20 (11/05/20 1917)  BP: (!) 101/55 (11/05/20 1917)  SpO2: 99 % (11/05/20 1917) Vital Signs (24h Range):  Temp:  [97.8 °F (36.6 °C)-98.5 °F (36.9 °C)] 97.8 °F (36.6 °C)  Pulse:  [102-128] 104  Resp:  [15-20] 20  SpO2:  [96 %-100 %] 99 %  BP: ()/(47-59) 101/55     Weight: 59.9 kg (132 lb)  Body mass index is 18.41 kg/m².    Physical Exam  Vitals signs and nursing note reviewed.   Constitutional:       General: He is not in acute distress.     Appearance: He is cachectic.      Comments: Thin, elderly gentleman lying in bed in no acute distress   HENT:      Nose: No congestion.   Eyes:      General: No scleral icterus.  Neck:      Musculoskeletal: Neck supple.   Cardiovascular:      Rate and Rhythm: Regular rhythm. Tachycardia present.      Heart sounds: Normal heart sounds. No murmur.   Pulmonary:      Effort: Pulmonary effort is normal. No respiratory distress.      Breath sounds: Normal breath sounds. No wheezing or rales.   Abdominal:      General: Abdomen is flat. Bowel sounds are normal. There is no distension.      Palpations: Abdomen is soft.      Tenderness: There is no abdominal tenderness.   Musculoskeletal: Normal range of motion.         General: No swelling.   Skin:     General: Skin is warm and dry.      Coloration: Skin is pale.   Neurological:      General: No focal deficit present.      Mental Status: He is alert and oriented to  person, place, and time.      Motor: Weakness present.   Psychiatric:         Mood and Affect: Mood normal.         Thought Content: Thought content normal.             Significant Labs:   BMP:   Recent Labs   Lab 11/05/20  1540   *      K 4.0      CO2 23   BUN 27*   CREATININE 1.4   CALCIUM 8.1*     CBC:   Recent Labs   Lab 11/05/20  1540   WBC 6.17   HGB 7.6*   HCT 24.5*   *       Significant Imaging: I have reviewed all pertinent imaging results/findings within the past 24 hours.

## 2020-11-06 NOTE — ASSESSMENT & PLAN NOTE
-Pt with constipation likely from decreased po intake and dehydration  -Would recommend and enema.

## 2020-11-06 NOTE — NURSING
Pt arrived to unit on stretcher on RA.  NAD noted.  Blood infusing.  Oriented pt to room and instructed to call for assistance. Will continue to monitor.

## 2020-11-06 NOTE — ASSESSMENT & PLAN NOTE
States has been constipated for the past week  Offered enema or suppository, refuses at this time  Start senna-docusate and mirilax daily  Had BM today

## 2020-11-06 NOTE — SUBJECTIVE & OBJECTIVE
Interval History: No acute events overnight, patient feeling a little better this morning. Had a bowel movement today. Spoke with family and planning for discharge home tomorrow after receiving another unit of blood.    Review of Systems   Constitutional: Positive for fatigue. Negative for chills and fever.   HENT: Negative for congestion and sore throat.    Respiratory: Negative for cough and shortness of breath.    Cardiovascular: Negative for palpitations.   Gastrointestinal: Positive for constipation. Negative for abdominal pain, blood in stool, diarrhea and nausea.   Genitourinary: Positive for difficulty urinating. Negative for dysuria.   Musculoskeletal: Positive for arthralgias. Negative for joint swelling.   Skin: Negative for rash.   Neurological: Positive for weakness. Negative for syncope.   Psychiatric/Behavioral: Negative for confusion. The patient is not nervous/anxious.      Objective:     Vital Signs (Most Recent):  Temp: 99 °F (37.2 °C) (11/06/20 1615)  Pulse: 97 (11/06/20 1615)  Resp: 18 (11/06/20 1615)  BP: (!) 115/56 (11/06/20 1615)  SpO2: 97 % (11/06/20 1615) Vital Signs (24h Range):  Temp:  [97.8 °F (36.6 °C)-100.2 °F (37.9 °C)] 99 °F (37.2 °C)  Pulse:  [] 97  Resp:  [16-43] 18  SpO2:  [92 %-99 %] 97 %  BP: ()/(51-59) 115/56     Weight: 58.7 kg (129 lb 6.6 oz)  Body mass index is 18.05 kg/m².    Intake/Output Summary (Last 24 hours) at 11/6/2020 1708  Last data filed at 11/6/2020 0300  Gross per 24 hour   Intake 1856 ml   Output 200 ml   Net 1656 ml      Physical Exam  Vitals signs and nursing note reviewed.   Constitutional:       General: He is not in acute distress.     Appearance: He is cachectic.      Comments: Thin, elderly gentleman lying in bed in no acute distress   HENT:      Nose: No congestion.   Eyes:      General: No scleral icterus.  Neck:      Musculoskeletal: Neck supple.   Cardiovascular:      Rate and Rhythm: Regular rhythm. Tachycardia present.      Heart  sounds: Normal heart sounds. No murmur.   Pulmonary:      Effort: Pulmonary effort is normal. No respiratory distress.      Breath sounds: Normal breath sounds. No wheezing or rales.   Abdominal:      General: Abdomen is flat. Bowel sounds are normal. There is no distension.      Palpations: Abdomen is soft.      Tenderness: There is no abdominal tenderness.   Musculoskeletal: Normal range of motion.         General: No swelling.   Skin:     General: Skin is warm and dry.      Coloration: Skin is pale.   Neurological:      General: No focal deficit present.      Mental Status: He is alert and oriented to person, place, and time.      Motor: Weakness present.   Psychiatric:         Mood and Affect: Mood normal.         Thought Content: Thought content normal.         Significant Labs: All pertinent labs within the past 24 hours have been reviewed.    Significant Imaging: I have reviewed all pertinent imaging results/findings within the past 24 hours.

## 2020-11-06 NOTE — HPI
Mr. Franco is a 74 yo M with PMHx of advanced metastatic prostate cancer, RENETTA, and bone mets who presents to the ED with complaints of feeling weak. He states this is how he felt in the past when his blood count was low. The patient states that he's not been able to get up and move around very much in the last week. Denies any history of dark colored stools, but states he hasn't had a bowel movement in about a week. His ex-wife is present and able to give some history about his weakness recently. Has intermittent fevers at home but none recently. Denies abdominal pain or nausea/vomiting. Follows with Dr. Alas for metastatic prostate cancer, recently admitted last month for symatic anemia and underwent EGD without evidence of bleeding. Placed on PPI and felt much better after blood transfusions.    In ED, found to be tachycardic, hypotensive and anemic with Hb 7.6. Patient given 1 liter of fluids with improvement in blood pressure and heart rate. Hospital Medicine was asked to admit patient for symptomatic anemia.

## 2020-11-06 NOTE — ASSESSMENT & PLAN NOTE
"Hb 7.6 - likely concentrated and lower, received a liter of IVF in ED  Plan to transfuse 1 unit pRBC and recheck CBC - Hb 7.5  Had EGD 10/14 - did not show any evidence of bleeding, continue on PPI  Refused CLAIRE in ED, spoke to patient about colonoscopy and he said "Not right now"  Due to BM involvement per Hem/Onc  Will give 1 more unit of pRBC to patient as he is still feeling weak  Spoke with daughter about home with hospice and that will be the plan once patient is discharged  "

## 2020-11-06 NOTE — ASSESSMENT & PLAN NOTE
Still smokes 1/2 pack a day  Counseled on smoking cessation  Offered NRT, does not want it at this time

## 2020-11-06 NOTE — ASSESSMENT & PLAN NOTE
"Hb 7.6 - likely concentrated and lower, received a liter of IVF in ED  Plan to transfuse 1 unit pRBC and recheck CBC  Had EGD 10/14 - did not show any evidence of bleeding, continue on PPI  Refused CLAIRE in ED, spoke to patient about colonoscopy and he said "Not right now"  Possibly 2/2 to GI source vs advanced prostate cancer with bone mets?  Will ask Hem/Onc to see patient tomorrow    "

## 2020-11-06 NOTE — SUBJECTIVE & OBJECTIVE
Oncology Treatment Plan:   [No treatment plan]    Medications:  Continuous Infusions:  Scheduled Meds:   dronabinoL  2.5 mg Oral BID AC    pantoprazole  40 mg Oral Daily    polyethylene glycol  17 g Oral Daily    pregabalin  25 mg Oral BID    senna-docusate 8.6-50 mg  1 tablet Oral BID     PRN Meds:sodium chloride, sodium chloride, dextrose 50%, dextrose 50%, dextrose 50%, glucagon (human recombinant), glucose, glucose, insulin aspart U-100, sodium chloride 0.9%, sodium chloride 0.9%     Review of patient's allergies indicates:  No Known Allergies     Past Medical History:   Diagnosis Date    Anemia     Appetite loss     Cigarette smoker     DMII (diabetes mellitus, type 2)     Generalized weakness     Hypertension     Neuropathy due to chemotherapeutic drug     Prostate cancer     with metastasis     Past Surgical History:   Procedure Laterality Date    ESOPHAGOGASTRODUODENOSCOPY N/A 10/14/2020    Procedure: EGD (ESOPHAGOGASTRODUODENOSCOPY);  Surgeon: Silviano Martinez MD;  Location: [facility];  Service: Endoscopy;  Laterality: N/A;    PROSTATECTOMY  around 1998     Family History     None        Tobacco Use    Smoking status: Current Every Day Smoker     Packs/day: 0.50     Years: 50.00     Pack years: 25.00     Types: Cigarettes    Smokeless tobacco: Never Used   Substance and Sexual Activity    Alcohol use: Not Currently     Comment: on occasion    Drug use: Never    Sexual activity: Not on file       Review of Systems   Constitutional: Negative for chills, diaphoresis, fatigue, fever and unexpected weight change.   HENT: Negative for sore throat and trouble swallowing.    Eyes: Negative for photophobia and visual disturbance.   Respiratory: Negative for cough, chest tightness and shortness of breath.    Cardiovascular: Negative for chest pain, palpitations and leg swelling.   Gastrointestinal: Positive for constipation. Negative for abdominal pain, diarrhea, nausea and vomiting.   Endocrine:  Negative for cold intolerance and heat intolerance.   Genitourinary: Negative for difficulty urinating, dysuria and hematuria.   Musculoskeletal: Negative for arthralgias, back pain and myalgias.   Skin: Negative for color change and rash.   Neurological: Positive for weakness. Negative for dizziness, light-headedness, numbness and headaches.   Hematological: Negative for adenopathy. Does not bruise/bleed easily.     Objective:     Vital Signs (Most Recent):  Temp: 99.9 °F (37.7 °C) (11/06/20 1149)  Pulse: 100 (11/06/20 1149)  Resp: 17 (11/06/20 1149)  BP: (!) 111/57 (11/06/20 1149)  SpO2: 96 % (11/06/20 1149) Vital Signs (24h Range):  Temp:  [97.8 °F (36.6 °C)-100.2 °F (37.9 °C)] 99.9 °F (37.7 °C)  Pulse:  [] 100  Resp:  [15-43] 17  SpO2:  [92 %-100 %] 96 %  BP: ()/(47-59) 111/57     Weight: 58.7 kg (129 lb 6.6 oz)  Body mass index is 18.05 kg/m².  Body surface area is 1.71 meters squared.      Intake/Output Summary (Last 24 hours) at 11/6/2020 1218  Last data filed at 11/6/2020 0300  Gross per 24 hour   Intake 1856 ml   Output 200 ml   Net 1656 ml       Physical Exam  Constitutional:       General: He is not in acute distress.     Appearance: He is well-developed. He is not diaphoretic.      Comments: Pt tired and keeping his eyes closed.   HENT:      Head: Normocephalic and atraumatic.   Eyes:      General: No scleral icterus.        Right eye: No discharge.         Left eye: No discharge.   Cardiovascular:      Rate and Rhythm: Normal rate and regular rhythm.      Heart sounds: Normal heart sounds. No murmur. No friction rub. No gallop.    Pulmonary:      Effort: Pulmonary effort is normal. No respiratory distress.      Breath sounds: Normal breath sounds. No wheezing or rales.   Chest:      Chest wall: No tenderness.   Abdominal:      General: Bowel sounds are normal. There is no distension.      Palpations: Abdomen is soft. There is no mass.      Tenderness: There is no abdominal tenderness. There  is no rebound.   Musculoskeletal: Normal range of motion.         General: No tenderness.   Skin:     General: Skin is warm and dry.      Findings: No erythema or rash.   Neurological:      Mental Status: He is alert and oriented to person, place, and time.      Coordination: Coordination normal.   Psychiatric:         Behavior: Behavior normal.         Significant Labs:   Recent Results (from the past 24 hour(s))   POCT glucose    Collection Time: 11/05/20  3:15 PM   Result Value Ref Range    POCT Glucose 163 (H) 70 - 110 mg/dL   CBC auto differential    Collection Time: 11/05/20  3:40 PM   Result Value Ref Range    WBC 6.17 3.90 - 12.70 K/uL    RBC 2.70 (L) 4.60 - 6.20 M/uL    Hemoglobin 7.6 (L) 14.0 - 18.0 g/dL    Hematocrit 24.5 (L) 40.0 - 54.0 %    MCV 91 82 - 98 fL    MCH 28.1 27.0 - 31.0 pg    MCHC 31.0 (L) 32.0 - 36.0 g/dL    RDW 17.0 (H) 11.5 - 14.5 %    Platelets 137 (L) 150 - 350 K/uL    MPV 9.3 9.2 - 12.9 fL    Immature Granulocytes 1.0 (H) 0.0 - 0.5 %    Gran # (ANC) 4.9 1.8 - 7.7 K/uL    Immature Grans (Abs) 0.06 (H) 0.00 - 0.04 K/uL    Lymph # 0.6 (L) 1.0 - 4.8 K/uL    Mono # 0.6 0.3 - 1.0 K/uL    Eos # 0.0 0.0 - 0.5 K/uL    Baso # 0.01 0.00 - 0.20 K/uL    nRBC 1 (A) 0 /100 WBC    Gran % 79.2 (H) 38.0 - 73.0 %    Lymph % 10.2 (L) 18.0 - 48.0 %    Mono % 9.2 4.0 - 15.0 %    Eosinophil % 0.2 0.0 - 8.0 %    Basophil % 0.2 0.0 - 1.9 %    Aniso Slight     Ovalocytes Occasional     Tear Drop Cells Occasional     Differential Method Automated    Comprehensive metabolic panel    Collection Time: 11/05/20  3:40 PM   Result Value Ref Range    Sodium 139 136 - 145 mmol/L    Potassium 4.0 3.5 - 5.1 mmol/L    Chloride 105 95 - 110 mmol/L    CO2 23 23 - 29 mmol/L    Glucose 160 (H) 70 - 110 mg/dL    BUN 27 (H) 8 - 23 mg/dL    Creatinine 1.4 0.5 - 1.4 mg/dL    Calcium 8.1 (L) 8.7 - 10.5 mg/dL    Total Protein 7.2 6.0 - 8.4 g/dL    Albumin 2.4 (L) 3.5 - 5.2 g/dL    Total Bilirubin 0.6 0.1 - 1.0 mg/dL    Alkaline  Phosphatase 337 (H) 55 - 135 U/L    AST 30 10 - 40 U/L    ALT 16 10 - 44 U/L    Anion Gap 11 8 - 16 mmol/L    eGFR if African American 57 (A) >60 mL/min/1.73 m^2    eGFR if non African American 49 (A) >60 mL/min/1.73 m^2   Type & Screen    Collection Time: 11/05/20  3:40 PM   Result Value Ref Range    Group & Rh O POS     Indirect Rita NEG    Lactic acid, plasma    Collection Time: 11/05/20  3:40 PM   Result Value Ref Range    Lactate (Lactic Acid) 1.7 0.5 - 2.2 mmol/L   Brain natriuretic peptide    Collection Time: 11/05/20  3:40 PM   Result Value Ref Range    BNP 64 0 - 99 pg/mL   Troponin I    Collection Time: 11/05/20  3:40 PM   Result Value Ref Range    Troponin I 0.008 0.000 - 0.026 ng/mL   TSH    Collection Time: 11/05/20  3:40 PM   Result Value Ref Range    TSH 2.508 0.400 - 4.000 uIU/mL   Prepare RBC 1 Unit    Collection Time: 11/05/20  3:40 PM   Result Value Ref Range    UNIT NUMBER [id number]     Product Code Q2609G57     DISPENSE STATUS TRANSFUSED     CODING SYSTEM KTQI035     Unit Blood Type Code 5100     Unit Blood Type O POS     Unit Expiration [id number]    POCT COVID-19 Rapid Screening    Collection Time: 11/05/20  6:11 PM   Result Value Ref Range    POC Rapid COVID Negative Negative     Acceptable Yes    Urinalysis, Reflex to Urine Culture Urine, Clean Catch    Collection Time: 11/05/20 11:26 PM    Specimen: Urine   Result Value Ref Range    Specimen UA Urine, Clean Catch     Color, UA Yellow Yellow, Straw, Piper    Appearance, UA Hazy (A) Clear    pH, UA 5.0 5.0 - 8.0    Specific Gravity, UA 1.015 1.005 - 1.030    Protein, UA 1+ (A) Negative    Glucose, UA Negative Negative    Ketones, UA Negative Negative    Bilirubin (UA) Negative Negative    Occult Blood UA 1+ (A) Negative    Nitrite, UA Negative Negative    Urobilinogen, UA Negative <2.0 EU/dL    Leukocytes, UA Negative Negative   Urinalysis Microscopic    Collection Time: 11/05/20 11:26 PM   Result Value Ref Range     RBC, UA 0 0 - 4 /hpf    WBC, UA 8 (H) 0 - 5 /hpf    Bacteria None None-Occ /hpf    Hyaline Casts, UA 0 0-1/lpf /lpf    WBC Casts, UA 1 (A) None /lpf    Granular Casts, UA 5 (A) None /lpf    Microscopic Comment SEE COMMENT    POCT glucose    Collection Time: 11/05/20 11:38 PM   Result Value Ref Range    POCT Glucose 143 (H) 70 - 110 mg/dL   Reticulocytes    Collection Time: 11/06/20 12:44 AM   Result Value Ref Range    Retic 0.8 0.4 - 2.0 %   CBC Auto Differential    Collection Time: 11/06/20  4:46 AM   Result Value Ref Range    WBC 5.36 3.90 - 12.70 K/uL    RBC 2.57 (L) 4.60 - 6.20 M/uL    Hemoglobin 7.5 (L) 14.0 - 18.0 g/dL    Hematocrit 23.0 (L) 40.0 - 54.0 %    MCV 90 82 - 98 fL    MCH 29.2 27.0 - 31.0 pg    MCHC 32.6 32.0 - 36.0 g/dL    RDW 15.8 (H) 11.5 - 14.5 %    Platelets 126 (L) 150 - 350 K/uL    MPV 9.4 9.2 - 12.9 fL    Immature Granulocytes 0.9 (H) 0.0 - 0.5 %    Gran # (ANC) 4.2 1.8 - 7.7 K/uL    Immature Grans (Abs) 0.05 (H) 0.00 - 0.04 K/uL    Lymph # 0.6 (L) 1.0 - 4.8 K/uL    Mono # 0.6 0.3 - 1.0 K/uL    Eos # 0.0 0.0 - 0.5 K/uL    Baso # 0.00 0.00 - 0.20 K/uL    nRBC 0 0 /100 WBC    Gran % 77.7 (H) 38.0 - 73.0 %    Lymph % 10.6 (L) 18.0 - 48.0 %    Mono % 10.6 4.0 - 15.0 %    Eosinophil % 0.2 0.0 - 8.0 %    Basophil % 0.0 0.0 - 1.9 %    Differential Method Automated    POCT glucose    Collection Time: 11/06/20  6:35 AM   Result Value Ref Range    POCT Glucose 102 70 - 110 mg/dL   POCT glucose    Collection Time: 11/06/20 11:48 AM   Result Value Ref Range    POCT Glucose 128 (H) 70 - 110 mg/dL     Microbiology Results (last 7 days)     ** No results found for the last 168 hours. **            Diagnostic Results:  CXR 11/05/20    No acute cardiopulmonary abnormality.

## 2020-11-06 NOTE — ASSESSMENT & PLAN NOTE
States has been constipated for the past week  Offered enema or suppository, refuses at this time  Start senna-docusate and mirilax daily

## 2020-11-06 NOTE — HPI
The patient is a 74 yo M with metastatic prostate cancer, DMII, HTN, Neuropathy, anemia of chronic disease who presented to the hospital on 11/05/20 with complaint of weakness.  He states he has been feeling weak for several days similar to his symptoms when he presented to the hospital with anemia.  On presentation to the ER the patient was hypotensive and with a hemoglobin of 7.6g/dL.  The patient received a unit of PRBC's with hemoglobin of 7.5g/dL on 11/06/20.  Currently the patient complains of constipation with last BM nearly a week ago.  He denies abdominal pain, N/V.  The patient denies CP, SOB, fever, chills, night sweats, weight loss, new lumps or bumps, easy bruising or bleeding.

## 2020-11-06 NOTE — PROGRESS NOTES
[facility] Hospital Medicine  Progress Note    Patient Name: Colten Franco  MRN: [id number]  Patient Class: IP- Inpatient   Admission Date: 11/5/2020  Length of Stay: 1 days  Attending Physician: Sincere Traore MD  Primary Care Provider: Rosalio Azar MD        Subjective:     Principal Problem:Symptomatic anemia        HPI:  Mr. Franco is a 74 yo M with PMHx of advanced metastatic prostate cancer, RENETTA, and bone mets who presents to the ED with complaints of feeling weak. He states this is how he felt in the past when his blood count was low. The patient states that he's not been able to get up and move around very much in the last week. Denies any history of dark colored stools, but states he hasn't had a bowel movement in about a week. His ex-wife is present and able to give some history about his weakness recently. Has intermittent fevers at home but none recently. Denies abdominal pain or nausea/vomiting. Follows with Dr. Alas for metastatic prostate cancer, recently admitted last month for symatic anemia and underwent EGD without evidence of bleeding. Placed on PPI and felt much better after blood transfusions.    In ED, found to be tachycardic, hypotensive and anemic with Hb 7.6. Patient given 1 liter of fluids with improvement in blood pressure and heart rate. Hospital Medicine was asked to admit patient for symptomatic anemia.    Overview/Hospital Course:  No notes on file    Interval History: No acute events overnight, patient feeling a little better this morning. Had a bowel movement today. Spoke with family and planning for discharge home tomorrow after receiving another unit of blood.    Review of Systems   Constitutional: Positive for fatigue. Negative for chills and fever.   HENT: Negative for congestion and sore throat.    Respiratory: Negative for cough and shortness of breath.    Cardiovascular: Negative for palpitations.   Gastrointestinal: Positive for constipation.  Negative for abdominal pain, blood in stool, diarrhea and nausea.   Genitourinary: Positive for difficulty urinating. Negative for dysuria.   Musculoskeletal: Positive for arthralgias. Negative for joint swelling.   Skin: Negative for rash.   Neurological: Positive for weakness. Negative for syncope.   Psychiatric/Behavioral: Negative for confusion. The patient is not nervous/anxious.      Objective:     Vital Signs (Most Recent):  Temp: 99 °F (37.2 °C) (11/06/20 1615)  Pulse: 97 (11/06/20 1615)  Resp: 18 (11/06/20 1615)  BP: (!) 115/56 (11/06/20 1615)  SpO2: 97 % (11/06/20 1615) Vital Signs (24h Range):  Temp:  [97.8 °F (36.6 °C)-100.2 °F (37.9 °C)] 99 °F (37.2 °C)  Pulse:  [] 97  Resp:  [16-43] 18  SpO2:  [92 %-99 %] 97 %  BP: ()/(51-59) 115/56     Weight: 58.7 kg (129 lb 6.6 oz)  Body mass index is 18.05 kg/m².    Intake/Output Summary (Last 24 hours) at 11/6/2020 1708  Last data filed at 11/6/2020 0300  Gross per 24 hour   Intake 1856 ml   Output 200 ml   Net 1656 ml      Physical Exam  Vitals signs and nursing note reviewed.   Constitutional:       General: He is not in acute distress.     Appearance: He is cachectic.      Comments: Thin, elderly gentleman lying in bed in no acute distress   HENT:      Nose: No congestion.   Eyes:      General: No scleral icterus.  Neck:      Musculoskeletal: Neck supple.   Cardiovascular:      Rate and Rhythm: Regular rhythm. Tachycardia present.      Heart sounds: Normal heart sounds. No murmur.   Pulmonary:      Effort: Pulmonary effort is normal. No respiratory distress.      Breath sounds: Normal breath sounds. No wheezing or rales.   Abdominal:      General: Abdomen is flat. Bowel sounds are normal. There is no distension.      Palpations: Abdomen is soft.      Tenderness: There is no abdominal tenderness.   Musculoskeletal: Normal range of motion.         General: No swelling.   Skin:     General: Skin is warm and dry.      Coloration: Skin is pale.  "  Neurological:      General: No focal deficit present.      Mental Status: He is alert and oriented to person, place, and time.      Motor: Weakness present.   Psychiatric:         Mood and Affect: Mood normal.         Thought Content: Thought content normal.         Significant Labs: All pertinent labs within the past 24 hours have been reviewed.    Significant Imaging: I have reviewed all pertinent imaging results/findings within the past 24 hours.      Assessment/Plan:      * Symptomatic anemia  Hb 7.6 - likely concentrated and lower, received a liter of IVF in ED  Plan to transfuse 1 unit pRBC and recheck CBC - Hb 7.5  Had EGD 10/14 - did not show any evidence of bleeding, continue on PPI  Refused CLAIRE in ED, spoke to patient about colonoscopy and he said "Not right now"  Due to BM involvement per Hem/Onc  Will give 1 more unit of pRBC to patient as he is still feeling weak  Spoke with daughter about home with hospice and that will be the plan once patient is discharged    Constipation  States has been constipated for the past week  Offered enema or suppository, refuses at this time  Start senna-docusate and mirilax daily  Had BM today      Tobacco abuse  Still smokes 1/2 pack a day  Counseled on smoking cessation  Offered NRT, does not want it at this time      Prostate cancer  Advanced prostate cancer with mets to prostate  Per Dr. Alas's note, has exhausted all chemo treatment options  Plan for hospice at home   Daughter will call once home and research different [place], will plan for outpatient  to help navigate      DMII (diabetes mellitus, type 2)  Hx of DM2  Will place on low dose SSI and Accuchecks      VTE Risk Mitigation (From admission, onward)         Ordered     IP VTE HIGH RISK PATIENT  Once      11/05/20 1844     Place sequential compression device  Until discontinued      11/05/20 1844                Discharge Planning   CAR:      Code Status: Full Code   Is the patient medically " ready for discharge?:     Reason for patient still in hospital (select all that apply): Treatment  Discharge Plan A: Hospice/home   Discharge Delays: None known at this time              Sincere Traore MD  Department of Hospital Medicine   [facility]

## 2020-11-06 NOTE — PROGRESS NOTES
Discussed with Dr Traore and MERON Barrera. Patient is already interested in hospice. CM will discuss choices and then Hospice can answer any questions as to expedite d/c. Patient wants to go home today.

## 2020-11-06 NOTE — ED NOTES
Patient report given to Francesca PARRA. All questions asked and answered. Awaiting transportation.

## 2020-11-06 NOTE — CONSULTS
[facility]  Hematology/Oncology  Consult Note    Patient Name: Colten Franco  MRN: [id number]  Admission Date: 11/5/2020  Hospital Length of Stay: 1 days  Code Status: Full Code   Attending Provider: Sincere Traore MD  Consulting Provider: Edgardo Alas MD  Primary Care Physician: Rosalio Azar MD  Principal Problem:Symptomatic anemia    Inpatient consult to Hematology  Consult performed by: Edgardo Alas MD  Consult ordered by: Sincere Traore MD  Reason for consult: Prostate Cancer        Subjective:     HPI:  The patient is a 74 yo M with metastatic prostate cancer, DMII, HTN, Neuropathy, anemia of chronic disease who presented to the hospital on 11/05/20 with complaint of weakness.  He states he has been feeling weak for several days similar to his symptoms when he presented to the hospital with anemia.  On presentation to the ER the patient was hypotensive and with a hemoglobin of 7.6g/dL.  The patient received a unit of PRBC's with hemoglobin of 7.5g/dL on 11/06/20.  Currently the patient complains of constipation with last BM nearly a week ago.  He denies abdominal pain, N/V.  The patient denies CP, SOB, fever, chills, night sweats, weight loss, new lumps or bumps, easy bruising or bleeding.    Oncology Treatment Plan:   [No treatment plan]    Medications:  Continuous Infusions:  Scheduled Meds:   dronabinoL  2.5 mg Oral BID AC    pantoprazole  40 mg Oral Daily    polyethylene glycol  17 g Oral Daily    pregabalin  25 mg Oral BID    senna-docusate 8.6-50 mg  1 tablet Oral BID     PRN Meds:sodium chloride, sodium chloride, dextrose 50%, dextrose 50%, dextrose 50%, glucagon (human recombinant), glucose, glucose, insulin aspart U-100, sodium chloride 0.9%, sodium chloride 0.9%     Review of patient's allergies indicates:  No Known Allergies     Past Medical History:   Diagnosis Date    Anemia     Appetite loss     Cigarette smoker     DMII (diabetes mellitus, type 2)      Generalized weakness     Hypertension     Neuropathy due to chemotherapeutic drug     Prostate cancer     with metastasis     Past Surgical History:   Procedure Laterality Date    ESOPHAGOGASTRODUODENOSCOPY N/A 10/14/2020    Procedure: EGD (ESOPHAGOGASTRODUODENOSCOPY);  Surgeon: Silviano Martinez MD;  Location: [facility];  Service: Endoscopy;  Laterality: N/A;    PROSTATECTOMY  around 1998     Family History     None        Tobacco Use    Smoking status: Current Every Day Smoker     Packs/day: 0.50     Years: 50.00     Pack years: 25.00     Types: Cigarettes    Smokeless tobacco: Never Used   Substance and Sexual Activity    Alcohol use: Not Currently     Comment: on occasion    Drug use: Never    Sexual activity: Not on file       Review of Systems   Constitutional: Negative for chills, diaphoresis, fatigue, fever and unexpected weight change.   HENT: Negative for sore throat and trouble swallowing.    Eyes: Negative for photophobia and visual disturbance.   Respiratory: Negative for cough, chest tightness and shortness of breath.    Cardiovascular: Negative for chest pain, palpitations and leg swelling.   Gastrointestinal: Positive for constipation. Negative for abdominal pain, diarrhea, nausea and vomiting.   Endocrine: Negative for cold intolerance and heat intolerance.   Genitourinary: Negative for difficulty urinating, dysuria and hematuria.   Musculoskeletal: Negative for arthralgias, back pain and myalgias.   Skin: Negative for color change and rash.   Neurological: Positive for weakness. Negative for dizziness, light-headedness, numbness and headaches.   Hematological: Negative for adenopathy. Does not bruise/bleed easily.     Objective:     Vital Signs (Most Recent):  Temp: 99.9 °F (37.7 °C) (11/06/20 1149)  Pulse: 100 (11/06/20 1149)  Resp: 17 (11/06/20 1149)  BP: (!) 111/57 (11/06/20 1149)  SpO2: 96 % (11/06/20 1149) Vital Signs (24h Range):  Temp:  [97.8 °F (36.6 °C)-100.2 °F (37.9 °C)] 99.9 °F  (37.7 °C)  Pulse:  [] 100  Resp:  [15-43] 17  SpO2:  [92 %-100 %] 96 %  BP: ()/(47-59) 111/57     Weight: 58.7 kg (129 lb 6.6 oz)  Body mass index is 18.05 kg/m².  Body surface area is 1.71 meters squared.      Intake/Output Summary (Last 24 hours) at 11/6/2020 1218  Last data filed at 11/6/2020 0300  Gross per 24 hour   Intake 1856 ml   Output 200 ml   Net 1656 ml       Physical Exam  Constitutional:       General: He is not in acute distress.     Appearance: He is well-developed. He is not diaphoretic.      Comments: Pt tired and keeping his eyes closed.   HENT:      Head: Normocephalic and atraumatic.   Eyes:      General: No scleral icterus.        Right eye: No discharge.         Left eye: No discharge.   Cardiovascular:      Rate and Rhythm: Normal rate and regular rhythm.      Heart sounds: Normal heart sounds. No murmur. No friction rub. No gallop.    Pulmonary:      Effort: Pulmonary effort is normal. No respiratory distress.      Breath sounds: Normal breath sounds. No wheezing or rales.   Chest:      Chest wall: No tenderness.   Abdominal:      General: Bowel sounds are normal. There is no distension.      Palpations: Abdomen is soft. There is no mass.      Tenderness: There is no abdominal tenderness. There is no rebound.   Musculoskeletal: Normal range of motion.         General: No tenderness.   Skin:     General: Skin is warm and dry.      Findings: No erythema or rash.   Neurological:      Mental Status: He is alert and oriented to person, place, and time.      Coordination: Coordination normal.   Psychiatric:         Behavior: Behavior normal.         Significant Labs:   Recent Results (from the past 24 hour(s))   POCT glucose    Collection Time: 11/05/20  3:15 PM   Result Value Ref Range    POCT Glucose 163 (H) 70 - 110 mg/dL   CBC auto differential    Collection Time: 11/05/20  3:40 PM   Result Value Ref Range    WBC 6.17 3.90 - 12.70 K/uL    RBC 2.70 (L) 4.60 - 6.20 M/uL    Hemoglobin  7.6 (L) 14.0 - 18.0 g/dL    Hematocrit 24.5 (L) 40.0 - 54.0 %    MCV 91 82 - 98 fL    MCH 28.1 27.0 - 31.0 pg    MCHC 31.0 (L) 32.0 - 36.0 g/dL    RDW 17.0 (H) 11.5 - 14.5 %    Platelets 137 (L) 150 - 350 K/uL    MPV 9.3 9.2 - 12.9 fL    Immature Granulocytes 1.0 (H) 0.0 - 0.5 %    Gran # (ANC) 4.9 1.8 - 7.7 K/uL    Immature Grans (Abs) 0.06 (H) 0.00 - 0.04 K/uL    Lymph # 0.6 (L) 1.0 - 4.8 K/uL    Mono # 0.6 0.3 - 1.0 K/uL    Eos # 0.0 0.0 - 0.5 K/uL    Baso # 0.01 0.00 - 0.20 K/uL    nRBC 1 (A) 0 /100 WBC    Gran % 79.2 (H) 38.0 - 73.0 %    Lymph % 10.2 (L) 18.0 - 48.0 %    Mono % 9.2 4.0 - 15.0 %    Eosinophil % 0.2 0.0 - 8.0 %    Basophil % 0.2 0.0 - 1.9 %    Aniso Slight     Ovalocytes Occasional     Tear Drop Cells Occasional     Differential Method Automated    Comprehensive metabolic panel    Collection Time: 11/05/20  3:40 PM   Result Value Ref Range    Sodium 139 136 - 145 mmol/L    Potassium 4.0 3.5 - 5.1 mmol/L    Chloride 105 95 - 110 mmol/L    CO2 23 23 - 29 mmol/L    Glucose 160 (H) 70 - 110 mg/dL    BUN 27 (H) 8 - 23 mg/dL    Creatinine 1.4 0.5 - 1.4 mg/dL    Calcium 8.1 (L) 8.7 - 10.5 mg/dL    Total Protein 7.2 6.0 - 8.4 g/dL    Albumin 2.4 (L) 3.5 - 5.2 g/dL    Total Bilirubin 0.6 0.1 - 1.0 mg/dL    Alkaline Phosphatase 337 (H) 55 - 135 U/L    AST 30 10 - 40 U/L    ALT 16 10 - 44 U/L    Anion Gap 11 8 - 16 mmol/L    eGFR if African American 57 (A) >60 mL/min/1.73 m^2    eGFR if non African American 49 (A) >60 mL/min/1.73 m^2   Type & Screen    Collection Time: 11/05/20  3:40 PM   Result Value Ref Range    Group & Rh O POS     Indirect Rita NEG    Lactic acid, plasma    Collection Time: 11/05/20  3:40 PM   Result Value Ref Range    Lactate (Lactic Acid) 1.7 0.5 - 2.2 mmol/L   Brain natriuretic peptide    Collection Time: 11/05/20  3:40 PM   Result Value Ref Range    BNP 64 0 - 99 pg/mL   Troponin I    Collection Time: 11/05/20  3:40 PM   Result Value Ref Range    Troponin I 0.008 0.000 - 0.026  ng/mL   TSH    Collection Time: 11/05/20  3:40 PM   Result Value Ref Range    TSH 2.508 0.400 - 4.000 uIU/mL   Prepare RBC 1 Unit    Collection Time: 11/05/20  3:40 PM   Result Value Ref Range    UNIT NUMBER [id number]     Product Code S6968S51     DISPENSE STATUS TRANSFUSED     CODING SYSTEM VPSO342     Unit Blood Type Code 5100     Unit Blood Type O POS     Unit Expiration [id number]    POCT COVID-19 Rapid Screening    Collection Time: 11/05/20  6:11 PM   Result Value Ref Range    POC Rapid COVID Negative Negative     Acceptable Yes    Urinalysis, Reflex to Urine Culture Urine, Clean Catch    Collection Time: 11/05/20 11:26 PM    Specimen: Urine   Result Value Ref Range    Specimen UA Urine, Clean Catch     Color, UA Yellow Yellow, Straw, Piper    Appearance, UA Hazy (A) Clear    pH, UA 5.0 5.0 - 8.0    Specific Gravity, UA 1.015 1.005 - 1.030    Protein, UA 1+ (A) Negative    Glucose, UA Negative Negative    Ketones, UA Negative Negative    Bilirubin (UA) Negative Negative    Occult Blood UA 1+ (A) Negative    Nitrite, UA Negative Negative    Urobilinogen, UA Negative <2.0 EU/dL    Leukocytes, UA Negative Negative   Urinalysis Microscopic    Collection Time: 11/05/20 11:26 PM   Result Value Ref Range    RBC, UA 0 0 - 4 /hpf    WBC, UA 8 (H) 0 - 5 /hpf    Bacteria None None-Occ /hpf    Hyaline Casts, UA 0 0-1/lpf /lpf    WBC Casts, UA 1 (A) None /lpf    Granular Casts, UA 5 (A) None /lpf    Microscopic Comment SEE COMMENT    POCT glucose    Collection Time: 11/05/20 11:38 PM   Result Value Ref Range    POCT Glucose 143 (H) 70 - 110 mg/dL   Reticulocytes    Collection Time: 11/06/20 12:44 AM   Result Value Ref Range    Retic 0.8 0.4 - 2.0 %   CBC Auto Differential    Collection Time: 11/06/20  4:46 AM   Result Value Ref Range    WBC 5.36 3.90 - 12.70 K/uL    RBC 2.57 (L) 4.60 - 6.20 M/uL    Hemoglobin 7.5 (L) 14.0 - 18.0 g/dL    Hematocrit 23.0 (L) 40.0 - 54.0 %    MCV 90 82 - 98 fL    MCH 29.2  27.0 - 31.0 pg    MCHC 32.6 32.0 - 36.0 g/dL    RDW 15.8 (H) 11.5 - 14.5 %    Platelets 126 (L) 150 - 350 K/uL    MPV 9.4 9.2 - 12.9 fL    Immature Granulocytes 0.9 (H) 0.0 - 0.5 %    Gran # (ANC) 4.2 1.8 - 7.7 K/uL    Immature Grans (Abs) 0.05 (H) 0.00 - 0.04 K/uL    Lymph # 0.6 (L) 1.0 - 4.8 K/uL    Mono # 0.6 0.3 - 1.0 K/uL    Eos # 0.0 0.0 - 0.5 K/uL    Baso # 0.00 0.00 - 0.20 K/uL    nRBC 0 0 /100 WBC    Gran % 77.7 (H) 38.0 - 73.0 %    Lymph % 10.6 (L) 18.0 - 48.0 %    Mono % 10.6 4.0 - 15.0 %    Eosinophil % 0.2 0.0 - 8.0 %    Basophil % 0.0 0.0 - 1.9 %    Differential Method Automated    POCT glucose    Collection Time: 11/06/20  6:35 AM   Result Value Ref Range    POCT Glucose 102 70 - 110 mg/dL   POCT glucose    Collection Time: 11/06/20 11:48 AM   Result Value Ref Range    POCT Glucose 128 (H) 70 - 110 mg/dL     Microbiology Results (last 7 days)     ** No results found for the last 168 hours. **            Diagnostic Results:  CXR 11/05/20    No acute cardiopulmonary abnormality.    Assessment/Plan:     * Symptomatic anemia  -The patient has anemia likely due to bone marrow involvement with his prostate cancer  -Peripheral blood shows tear drop cells which are suggestive of this.  -The patient was likely dehydrated on admission with hemoconcentration  -Hemoglobin is now 7.5g/dL  -Pt to go home with hospice.  -Would not transfuse further.    Constipation  -Pt with constipation likely from decreased po intake and dehydration  -Would recommend and enema.    Prostate cancer  -Pt with metastatic prostate cancer s/p multiple lines of treatment  -The patient is not currently fit enough to undergo any other aggressive measures and has lab results concerning for bone marrow involvement with tear drop cells in the peripheral blood suggestive of a myelophthisic process.  -With bone marrow involvement there is not treatments that would be available.  -Hospice discussed with the patient and he is agreeable to home  hospice  -I have consulted SW and palliative care.  -This was discussed with the navneet, his ex-wife, and his daughter Kitty Franco        Thank you for your consult. Hematology/Oncology service will follow-up with patient. Please contact us if you have any additional questions.     Edgardo Alas MD  Hematology/Oncology  [facility] [place]

## 2020-11-06 NOTE — ASSESSMENT & PLAN NOTE
-The patient has anemia likely due to bone marrow involvement with his prostate cancer  -Peripheral blood shows tear drop cells which are suggestive of this.  -The patient was likely dehydrated on admission with hemoconcentration  -Hemoglobin is now 7.5g/dL  -Pt to go home with hospice.  -Would not transfuse further.

## 2020-11-06 NOTE — ASSESSMENT & PLAN NOTE
-Pt with metastatic prostate cancer s/p multiple lines of treatment  -The patient is not currently fit enough to undergo any other aggressive measures and has lab results concerning for bone marrow involvement with tear drop cells in the peripheral blood suggestive of a myelophthisic process.  -With bone marrow involvement there is not treatments that would be available.  -Hospice discussed with the patient and he is agreeable to home hospice  -I have consulted SW and palliative care.  -This was discussed with the patietn, his ex-wife, and his daughter Kitty Franco

## 2020-11-06 NOTE — ASSESSMENT & PLAN NOTE
Advanced prostate cancer with mets to prostate  Per Dr. Alas's note, has exhausted all chemo treatment options  Will have Hem/Onc see patient here

## 2020-11-06 NOTE — ASSESSMENT & PLAN NOTE
Advanced prostate cancer with mets to prostate  Per Dr. Alas's note, has exhausted all chemo treatment options  Plan for hospice at home   Daughter will call once home and research different companies, will plan for outpatient  to help navigate

## 2020-11-06 NOTE — H&P
"[facility] Hospital Medicine  History & Physical    Patient Name: Colten Franco  MRN: [id number]  Admission Date: 11/5/2020  Attending Physician: Danelle Sorto MD   Primary Care Provider: Rosalio Azar MD         Patient information was obtained from patient, spouse/SO and ER records.     Subjective:     Principal Problem:Symptomatic anemia    Chief Complaint:   Chief Complaint   Patient presents with    Fatigue     Pt's ex-wife states "I think his hemoglobin is low". Pt c/o weakness x1 week. Denies pain        HPI: Mr. Franco is a 76 yo M with PMHx of advanced metastatic prostate cancer, RENETTA, and bone mets who presents to the ED with complaints of feeling weak. He states this is how he felt in the past when his blood count was low. The patient states that he's not been able to get up and move around very much in the last week. Denies any history of dark colored stools, but states he hasn't had a bowel movement in about a week. His ex-wife is present and able to give some history about his weakness recently. Has intermittent fevers at home but none recently. Denies abdominal pain or nausea/vomiting. Follows with Dr. Alas for metastatic prostate cancer, recently admitted last month for symatic anemia and underwent EGD without evidence of bleeding. Placed on PPI and felt much better after blood transfusions.    In ED, found to be tachycardic, hypotensive and anemic with Hb 7.6. Patient given 1 liter of fluids with improvement in blood pressure and heart rate. Hospital Medicine was asked to admit patient for symptomatic anemia.    Past Medical History:   Diagnosis Date    Anemia     Appetite loss     Cigarette smoker     DMII (diabetes mellitus, type 2)     Generalized weakness     Hypertension     Neuropathy due to chemotherapeutic drug     Prostate cancer     with metastasis       Past Surgical History:   Procedure Laterality Date    ESOPHAGOGASTRODUODENOSCOPY N/A 10/14/2020    " Procedure: EGD (ESOPHAGOGASTRODUODENOSCOPY);  Surgeon: Silviano Martinez MD;  [facility];  Service: Endoscopy;  Laterality: N/A;    PROSTATECTOMY  around 1998       Review of patient's allergies indicates:  No Known Allergies    No current facility-administered medications on file prior to encounter.      Current Outpatient Medications on File Prior to Encounter   Medication Sig    dronabinoL (MARINOL) 2.5 MG capsule Take 1 capsule (2.5 mg total) by mouth 2 (two) times daily before meals. Take 1 capsule (2.5mg total) by mouth in the AM before breakfast, Take 2 capsules (5mg total) by mouth in the PM before dinner.    ondansetron (ZOFRAN) IVPB     pantoprazole (PROTONIX) 40 MG tablet Take 1 tablet (40 mg total) by mouth once daily.    pregabalin (LYRICA) 25 MG capsule Take 1 capsule (25 mg total) by mouth 2 (two) times daily.    [DISCONTINUED] metFORMIN (GLUCOPHAGE) 500 MG tablet Take 500 mg by mouth 2 (two) times daily with meals.    [DISCONTINUED] zolpidem 5 mg Subl      Family History     None        Tobacco Use    Smoking status: Current Every Day Smoker     Packs/day: 0.50     Years: 50.00     Pack years: 25.00     Types: Cigarettes    Smokeless tobacco: Never Used   Substance and Sexual Activity    Alcohol use: Not Currently     Comment: on occasion    Drug use: Never    Sexual activity: Not on file     Review of Systems   Constitutional: Positive for fatigue and fever. Negative for chills.   HENT: Negative for congestion and sore throat.    Respiratory: Negative for cough and shortness of breath.    Cardiovascular: Negative for palpitations.   Gastrointestinal: Positive for constipation. Negative for abdominal pain, blood in stool, diarrhea and nausea.   Genitourinary: Positive for difficulty urinating. Negative for dysuria.   Musculoskeletal: Positive for arthralgias. Negative for joint swelling.   Skin: Negative for rash.   Neurological: Positive for weakness. Negative for syncope.    Psychiatric/Behavioral: Negative for confusion. The patient is not nervous/anxious.      Objective:     Vital Signs (Most Recent):  Temp: 97.8 °F (36.6 °C) (11/05/20 1917)  Pulse: 104 (11/05/20 1917)  Resp: 20 (11/05/20 1917)  BP: (!) 101/55 (11/05/20 1917)  SpO2: 99 % (11/05/20 1917) Vital Signs (24h Range):  Temp:  [97.8 °F (36.6 °C)-98.5 °F (36.9 °C)] 97.8 °F (36.6 °C)  Pulse:  [102-128] 104  Resp:  [15-20] 20  SpO2:  [96 %-100 %] 99 %  BP: ()/(47-59) 101/55     Weight: 59.9 kg (132 lb)  Body mass index is 18.41 kg/m².    Physical Exam  Vitals signs and nursing note reviewed.   Constitutional:       General: He is not in acute distress.     Appearance: He is cachectic.      Comments: Thin, elderly gentleman lying in bed in no acute distress   HENT:      Nose: No congestion.   Eyes:      General: No scleral icterus.  Neck:      Musculoskeletal: Neck supple.   Cardiovascular:      Rate and Rhythm: Regular rhythm. Tachycardia present.      Heart sounds: Normal heart sounds. No murmur.   Pulmonary:      Effort: Pulmonary effort is normal. No respiratory distress.      Breath sounds: Normal breath sounds. No wheezing or rales.   Abdominal:      General: Abdomen is flat. Bowel sounds are normal. There is no distension.      Palpations: Abdomen is soft.      Tenderness: There is no abdominal tenderness.   Musculoskeletal: Normal range of motion.         General: No swelling.   Skin:     General: Skin is warm and dry.      Coloration: Skin is pale.   Neurological:      General: No focal deficit present.      Mental Status: He is alert and oriented to person, place, and time.      Motor: Weakness present.   Psychiatric:         Mood and Affect: Mood normal.         Thought Content: Thought content normal.             Significant Labs:   BMP:   Recent Labs   Lab 11/05/20  1540   *      K 4.0      CO2 23   BUN 27*   CREATININE 1.4   CALCIUM 8.1*     CBC:   Recent Labs   Lab 11/05/20  1540   WBC  "6.17   HGB 7.6*   HCT 24.5*   *       Significant Imaging: I have reviewed all pertinent imaging results/findings within the past 24 hours.    Assessment/Plan:     * Symptomatic anemia  Hb 7.6 - likely concentrated and lower, received a liter of IVF in ED  Plan to transfuse 1 unit pRBC and recheck CBC  Had EGD 10/14 - did not show any evidence of bleeding, continue on PPI  Refused CLAIRE in ED, spoke to patient about colonoscopy and he said "Not right now"  Possibly 2/2 to GI source vs advanced prostate cancer with bone mets?  Will ask Hem/Onc to see patient tomorrow      Constipation  States has been constipated for the past week  Offered enema or suppository, refuses at this time  Start senna-docusate and mirilax daily      Tobacco abuse  Still smokes 1/2 pack a day  Counseled on smoking cessation  Offered NRT, does not want it at this time      Prostate cancer  Advanced prostate cancer with mets to prostate  Per Dr. Alas's note, has exhausted all chemo treatment options  Will have Hem/Onc see patient here      DMII (diabetes mellitus, type 2)  Hx of DM2  Will place on low dose SSI and Accuchecks    VTE Risk Mitigation (From admission, onward)         Ordered     IP VTE HIGH RISK PATIENT  Once      11/05/20 1844     Place sequential compression device  Until discontinued      11/05/20 1844                   Sincere Traore MD  Department of Hospital Medicine   [facility]  "

## 2020-11-06 NOTE — PLAN OF CARE
Problem: Fall Injury Risk  Goal: Absence of Fall and Fall-Related Injury  Outcome: Ongoing, Progressing     Problem: Adult Inpatient Plan of Care  Goal: Plan of Care Review  Outcome: Ongoing, Progressing  Goal: Patient-Specific Goal (Individualization)  Outcome: Ongoing, Progressing  Goal: Absence of Hospital-Acquired Illness or Injury  Outcome: Ongoing, Progressing  Goal: Optimal Comfort and Wellbeing  Outcome: Ongoing, Progressing  Goal: Readiness for Transition of Care  Outcome: Ongoing, Progressing  Goal: Rounds/Family Conference  Outcome: Ongoing, Progressing     Problem: Skin Injury Risk Increased  Goal: Skin Health and Integrity  Outcome: Ongoing, Progressing     Problem: Diabetes Comorbidity  Goal: Blood Glucose Level Within Desired Range  Outcome: Ongoing, Progressing     Problem: Coping Ineffective  Goal: Effective Coping  Outcome: Ongoing, Progressing

## 2020-11-06 NOTE — PLAN OF CARE
11/06/20 1658   Post-Acute Status   Post-Acute Authorization Hospice   Hospice Status Referrals Sent  (Passages for home hospice)   Discharge Delays None known at this time   Discharge Plan   Discharge Plan A Hospice/home

## 2020-11-07 PROBLEM — K59.00 CONSTIPATION: Status: RESOLVED | Noted: 2020-11-05 | Resolved: 2020-11-07

## 2020-11-07 LAB
POCT GLUCOSE: 123 MG/DL (ref 70–110)
POCT GLUCOSE: 127 MG/DL (ref 70–110)
POCT GLUCOSE: 153 MG/DL (ref 70–110)

## 2020-11-07 PROCEDURE — 25000003 PHARM REV CODE 250: Performed by: STUDENT IN AN ORGANIZED HEALTH CARE EDUCATION/TRAINING PROGRAM

## 2020-11-07 PROCEDURE — 21400001 HC TELEMETRY ROOM

## 2020-11-07 PROCEDURE — 94761 N-INVAS EAR/PLS OXIMETRY MLT: CPT

## 2020-11-07 PROCEDURE — 63600175 PHARM REV CODE 636 W HCPCS: Performed by: STUDENT IN AN ORGANIZED HEALTH CARE EDUCATION/TRAINING PROGRAM

## 2020-11-07 RX ADMIN — PREGABALIN 25 MG: 25 CAPSULE ORAL at 08:11

## 2020-11-07 RX ADMIN — DRONABINOL 2.5 MG: 2.5 CAPSULE ORAL at 04:11

## 2020-11-07 RX ADMIN — PREGABALIN 25 MG: 25 CAPSULE ORAL at 10:11

## 2020-11-07 RX ADMIN — PANTOPRAZOLE SODIUM 40 MG: 40 TABLET, DELAYED RELEASE ORAL at 08:11

## 2020-11-07 RX ADMIN — DOCUSATE SODIUM 50 MG AND SENNOSIDES 8.6 MG 1 TABLET: 8.6; 5 TABLET, FILM COATED ORAL at 08:11

## 2020-11-07 RX ADMIN — DOCUSATE SODIUM 50 MG AND SENNOSIDES 8.6 MG 1 TABLET: 8.6; 5 TABLET, FILM COATED ORAL at 10:11

## 2020-11-07 RX ADMIN — DRONABINOL 2.5 MG: 2.5 CAPSULE ORAL at 09:11

## 2020-11-07 RX ADMIN — POLYETHYLENE GLYCOL 3350 17 G: 17 POWDER, FOR SOLUTION ORAL at 08:11

## 2020-11-07 NOTE — PLAN OF CARE
11/06/20 2246   Post-Acute Status   Post-Acute Authorization Other   Other Status See Comments  (ambulatory referral to outpt case management)   Discharge Plan   Discharge Plan A Hospice/home

## 2020-11-07 NOTE — CONSULTS
Patient will discharge home with family; family is currently setting up home health and does not want home with hospice at this time. Oncology follow-up was scheduled with Dr. Underwood. Patient and family did not want to follow-up with Dr. Alas.

## 2020-11-07 NOTE — NURSING
Report received from FIDEL Vaca. Visualized patient and assessed patient's overall condition and appearance. No acute distress noted. Will continue to monitor

## 2020-11-07 NOTE — PROGRESS NOTES
[facility] CASE MANAGEMENT: WRITTEN DISCHARGE INFORMATION [[id number]]      APPOINTMENTS & RESOURCES TO HELP YOU MANAGE YOUR CARE AT HOME BASED ON YOUR PREFERENCES:  (If an appointment is not scheduled for you when you leave the hospital, call your doctor to schedule a follow up visit within a week)        Follow-up Information     Nova Underwood MD. Go on 11/10/2020.    Specialties: Hematology and Oncology, Hematology  Why: @ 10:20am  Out-Patient Oncology Hospital Follow-Up scheduled for you.  Contact information:  [address]  [address]  [place] [address]  [phone]               Healthy Living Instructions to HELP MANAGE YOUR CARE AT HOME:  Things You are responsible for:  1.  Getting your prescriptions filled   2.  Taking your medications as directed, DO NOT MISS ANY DOSES!  3.  Following the diet and exercise recommended by your doctor  4.  Going to your follow-up doctor appointment. This is important because it allows the doctor to monitor your progress and determine if any changes need to made to your treatment plan.  5.  If you have any questions about MANAGING YOUR CARE AT HOME Call the Nurse Care Line   for 24/7 Assistance: [phone].      Please answer any calls you may receive from [facility]. We want to continue to support you as you manage your healthcare needs. [facility] is happy to have the opportunity to serve you.      Thank you for choosing [facility] for your healthcare needs!  Your [facility] Case Management Team

## 2020-11-07 NOTE — PROGRESS NOTES
Patricia from [facility] called to inform that she will be meeting with the patient's family today for home with hospice services; she was inquiring about the possibility of discharging today; Patricia indicated that would be able to accept him today.    Dr. Traore explained that the patient's family does not want home hospice but do want Home Health, and the family is already working on getting home health.

## 2020-11-07 NOTE — NURSING
mr ok in 10 had a 14 beat run of asymptomatic SVT. K+ =4.0 no Mag  Dr. Parham. Will continue to monitor.

## 2020-11-07 NOTE — PLAN OF CARE
11/06/20 1200   Discharge Assessment   Assessment Type Discharge Planning Assessment   Confirmed/corrected address and phone number on facesheet? Yes   Assessment information obtained from? Patient;Caregiver   Expected Length of Stay (days) 2   Communicated expected length of stay with patient/caregiver yes   Prior to hospitilization cognitive status: Alert/Oriented   Prior to hospitalization functional status: Needs Assistance   Current cognitive status: Alert/Oriented   Current Functional Status: Needs Assistance   Lives With child(rick), adult   Able to Return to Prior Arrangements yes   Is patient able to care for self after discharge? Unable to determine at this time (comments)   Who are your caregiver(s) and their phone number(s)? Kitty Franco (daughter) [phone]   Patient's perception of discharge disposition hospice/home   Readmission Within the Last 30 Days previous discharge plan unsuccessful   If yes, most recent facility name: [facility] - Symptomatic anemia   Patient currently being followed by outpatient case management? No   Equipment Currently Used at Home cane, straight   Do you have any problems affording any of your prescribed medications? No   Is the patient taking medications as prescribed? yes   Does the patient have transportation home? Yes   Transportation Anticipated family or friend will provide   Does the patient receive services at the [facility]? No   Discharge Plan A Hospice/home   DME Needed Upon Discharge  none   Patient/Family in Agreement with Plan yes   Readmission Questionnaire   At the time of your discharge, did someone talk to you about what your health problems were? Yes   At the time of discharge, did someone talk to you about what to watch out for regarding worsening of your health problem? Yes   At the time of discharge, did someone talk to you about what to do if you experienced worsening of your health problem? Yes   At the time of discharge, did someone talk to  you about which medication to take when you left the hospital and which ones to stop taking? Yes   At the time of discharge, did someone talk to you about when and where to follow up with a doctor after you left the hospital? Yes   What do you believe caused you to be sick enough to be re-admitted? became anemic again   How often do you need to have someone help you when you read instructions, pamphlets, or other written material from your doctor or pharmacy? Often   Do you have any problems affording any of  your prescribed medications? No   Do you have problems obtaining/receiving your medications? No   Does the patient have transportation to healthcare appointments? Yes   Living Arrangements house   Does the patient have family/friends to help with healtcare needs after discharge? yes   Does your caregiver provide all the help you need? Yes   Are you currently feeling confused? No   Are you currently having problems thinking? No   Are you currently having memory problems? No   Have you felt down, depressed, or hopeless? 1   Have you felt little interest or pleasure in doing things? 1   In the last 7 days, my sleep quality was: [id number] - [place], [place]  [address]  [place] [address]  Phone: [phone] Fax: [phone]    [address] [place], [place] - [address] [facility]  [address] [facility]  [place] [address]  Phone: [phone] Fax: [phone]    Rosalio Azar MD    Extended Emergency Contact Information  Primary Emergency Contact: deisi connell  Address: [address] [place] [address] [place] [address] [facility]  Home Phone: [phone]  Mobile Phone: [phone]  Relation: Daughter    Payor: [employer] / Plan: [employer] / Product Type: PPO /

## 2020-11-07 NOTE — ASSESSMENT & PLAN NOTE
Advanced prostate cancer with mets to prostate  Per Dr. Alas's note, has exhausted all chemo treatment options  Family would like second opinion and to speak with another Oncologist  Plan for discussion tomorrow then possibly home health and continue research on hospice companies

## 2020-11-07 NOTE — ASSESSMENT & PLAN NOTE
States has been constipated for the past week  Offered enema or suppository, refuses at this time  Start senna-docusate and mirilax daily  resolved

## 2020-11-07 NOTE — PLAN OF CARE
11/07/20 1351   Final Note   Assessment Type Final Discharge Note   Anticipated Discharge Disposition Home   What phone number can be called within the next 1-3 days to see how you are doing after discharge?   ([phone])   Hospital Follow Up  Appt(s) scheduled? Yes   Discharge plans and expectations educations in teach back method with documentation complete? Yes   Right Care Referral Info   Post Acute Recommendation No Care   Post-Acute Status   Post-Acute Authorization Other  (Home; oncology follow-up)   Hospice Status Set-up Complete   Other Status No Post-Acute Service Needs   Discharge Delays None known at this time

## 2020-11-07 NOTE — PROGRESS NOTES
[facility]  Progress Note    Patient Name: Colten Franco  MRN: [id number]  Patient Class: IP- Inpatient   Admission Date: 11/5/2020  Length of Stay: 2 days  Attending Physician: Sincere Traore MD  Primary Care Provider: Rosalio Azar MD        Subjective:     Principal Problem:Symptomatic anemia        HPI:  Mr. Franco is a 74 yo M with PMHx of advanced metastatic prostate cancer, RENETTA, and bone mets who presents to the ED with complaints of feeling weak. He states this is how he felt in the past when his blood count was low. The patient states that he's not been able to get up and move around very much in the last week. Denies any history of dark colored stools, but states he hasn't had a bowel movement in about a week. His ex-wife is present and able to give some history about his weakness recently. Has intermittent fevers at home but none recently. Denies abdominal pain or nausea/vomiting. Follows with Dr. Alas for metastatic prostate cancer, recently admitted last month for symatic anemia and underwent EGD without evidence of bleeding. Placed on PPI and felt much better after blood transfusions.    In ED, found to be tachycardic, hypotensive and anemic with Hb 7.6. Patient given 1 liter of fluids with improvement in blood pressure and heart rate. Hospital Medicine was asked to admit patient for symptomatic anemia.    Overview/Hospital Course:  No notes on file    Interval History: No acute events overnight, patient tolerated blood well and feeling better now that constipation has resolved.  Spoke with wife at bedside and initially planning for discharge home with second opinion.  Now spoke with daughter, and plan is to stay tonight and talk with Dr. Mathur tomorrow with likely dc home. Hospice company info given to family at bedside yesterday.     Review of Systems   Constitutional: Positive for fatigue. Negative for chills and fever.   Gastrointestinal: Negative  for abdominal pain and constipation.   Neurological: Positive for weakness.     Objective:     Vital Signs (Most Recent):  Temp: 98.6 °F (37 °C) (11/07/20 1643)  Pulse: 89 (11/07/20 1643)  Resp: 17 (11/07/20 1643)  BP: (!) 101/55 (11/07/20 1643)  SpO2: 95 % (11/07/20 1643) Vital Signs (24h Range):  Temp:  [97.6 °F (36.4 °C)-99.8 °F (37.7 °C)] 98.6 °F (37 °C)  Pulse:  [83-97] 89  Resp:  [17-18] 17  SpO2:  [95 %-97 %] 95 %  BP: (100-120)/(51-58) 101/55     Weight: 58.7 kg (129 lb 6.6 oz)  Body mass index is 18.05 kg/m².  No intake or output data in the 24 hours ending 11/07/20 1744   Physical Exam  Vitals signs and nursing note reviewed.   Constitutional:       General: He is not in acute distress.     Appearance: He is cachectic.      Comments: Thin, elderly gentleman lying in bed in no acute distress   HENT:      Nose: No congestion.   Eyes:      General: No scleral icterus.  Neck:      Musculoskeletal: Neck supple.   Cardiovascular:      Rate and Rhythm: Normal rate and regular rhythm.      Heart sounds: Normal heart sounds. No murmur.   Pulmonary:      Effort: Pulmonary effort is normal. No respiratory distress.      Breath sounds: Normal breath sounds. No wheezing or rales.   Abdominal:      General: Abdomen is flat. Bowel sounds are normal. There is no distension.      Palpations: Abdomen is soft.      Tenderness: There is no abdominal tenderness.   Musculoskeletal: Normal range of motion.         General: No swelling.   Skin:     General: Skin is warm and dry.      Coloration: Skin is pale.   Neurological:      General: No focal deficit present.      Mental Status: He is alert and oriented to person, place, and time.      Motor: Weakness present.   Psychiatric:         Mood and Affect: Mood normal.         Thought Content: Thought content normal.         Significant Labs: All pertinent labs within the past 24 hours have been reviewed.    Significant Imaging: I have reviewed all pertinent imaging  "results/findings within the past 24 hours.      Assessment/Plan:      * Symptomatic anemia  Hb 7.6 - likely concentrated and lower, received a liter of IVF in ED  Plan to transfuse 1 unit pRBC and recheck CBC - Hb 7.5  Had EGD 10/14 - did not show any evidence of bleeding, continue on PPI  Refused CLAIRE in ED, spoke to patient about colonoscopy and he said "Not right now"  Due to BM involvement per Hem/Onc  S/p 2 units pRBC and improving symptoms    Tobacco abuse  Still smokes 1/2 pack a day  Counseled on smoking cessation  Offered NRT, does not want it at this time      Prostate cancer  Advanced prostate cancer with mets to prostate  Per Dr. Alas's note, has exhausted all chemo treatment options  Family would like second opinion and to speak with another Oncologist  Plan for discussion tomorrow then possibly home health and continue research on hospice companies      DMII (diabetes mellitus, type 2)  Hx of DM2  Will place on low dose SSI and Accuchecks      VTE Risk Mitigation (From admission, onward)         Ordered     IP VTE HIGH RISK PATIENT  Once      11/05/20 1844     Place sequential compression device  Until discontinued      11/05/20 1844                Discharge Planning   CAR: 11/7/2020     Code Status: Full Code   Is the patient medically ready for discharge?:     Reason for patient still in hospital (select all that apply): Treatment  Discharge Plan A: Home with family(Oncology follow-up: )   Discharge Delays: None known at this time              Sincere Traore MD  Department of Hospital Medicine   [facility]    "

## 2020-11-07 NOTE — PLAN OF CARE
11/07/20 1350   Post-Acute Status   Post-Acute Authorization Other  (Home; oncology follow-up)   Hospice Status Set-up Complete   Other Status No Post-Acute Service Needs   Discharge Delays None known at this time   Discharge Plan   Discharge Plan A Home with family  (Oncology follow-up: )   Discharge Plan B Home with family  (Oncology follow-up: )

## 2020-11-07 NOTE — SUBJECTIVE & OBJECTIVE
Interval History: No acute events overnight, patient tolerated blood well and feeling better now that constipation has resolved.  Spoke with wife at bedside and initially planning for discharge home with second opinion.  Now spoke with daughter, and plan is to stay tonight and talk with Dr. Mathur tomorrow with likely dc home. Hospice company info given to family at bedside yesterday.     Review of Systems   Constitutional: Positive for fatigue. Negative for chills and fever.   Gastrointestinal: Negative for abdominal pain and constipation.   Neurological: Positive for weakness.     Objective:     Vital Signs (Most Recent):  Temp: 98.6 °F (37 °C) (11/07/20 1643)  Pulse: 89 (11/07/20 1643)  Resp: 17 (11/07/20 1643)  BP: (!) 101/55 (11/07/20 1643)  SpO2: 95 % (11/07/20 1643) Vital Signs (24h Range):  Temp:  [97.6 °F (36.4 °C)-99.8 °F (37.7 °C)] 98.6 °F (37 °C)  Pulse:  [83-97] 89  Resp:  [17-18] 17  SpO2:  [95 %-97 %] 95 %  BP: (100-120)/(51-58) 101/55     Weight: 58.7 kg (129 lb 6.6 oz)  Body mass index is 18.05 kg/m².  No intake or output data in the 24 hours ending 11/07/20 1744   Physical Exam  Vitals signs and nursing note reviewed.   Constitutional:       General: He is not in acute distress.     Appearance: He is cachectic.      Comments: Thin, elderly gentleman lying in bed in no acute distress   HENT:      Nose: No congestion.   Eyes:      General: No scleral icterus.  Neck:      Musculoskeletal: Neck supple.   Cardiovascular:      Rate and Rhythm: Normal rate and regular rhythm.      Heart sounds: Normal heart sounds. No murmur.   Pulmonary:      Effort: Pulmonary effort is normal. No respiratory distress.      Breath sounds: Normal breath sounds. No wheezing or rales.   Abdominal:      General: Abdomen is flat. Bowel sounds are normal. There is no distension.      Palpations: Abdomen is soft.      Tenderness: There is no abdominal tenderness.   Musculoskeletal: Normal range of motion.         General: No  swelling.   Skin:     General: Skin is warm and dry.      Coloration: Skin is pale.   Neurological:      General: No focal deficit present.      Mental Status: He is alert and oriented to person, place, and time.      Motor: Weakness present.   Psychiatric:         Mood and Affect: Mood normal.         Thought Content: Thought content normal.         Significant Labs: All pertinent labs within the past 24 hours have been reviewed.    Significant Imaging: I have reviewed all pertinent imaging results/findings within the past 24 hours.

## 2020-11-08 VITALS
DIASTOLIC BLOOD PRESSURE: 55 MMHG | RESPIRATION RATE: 18 BRPM | TEMPERATURE: 98 F | HEART RATE: 77 BPM | SYSTOLIC BLOOD PRESSURE: 113 MMHG | WEIGHT: 129.44 LBS | OXYGEN SATURATION: 96 % | BODY MASS INDEX: 18.12 KG/M2 | HEIGHT: 71 IN

## 2020-11-08 LAB
BASOPHILS # BLD AUTO: 0.01 K/UL (ref 0–0.2)
BASOPHILS NFR BLD: 0.2 % (ref 0–1.9)
DIFFERENTIAL METHOD: ABNORMAL
EOSINOPHIL # BLD AUTO: 0 K/UL (ref 0–0.5)
EOSINOPHIL NFR BLD: 0.7 % (ref 0–8)
ERYTHROCYTE [DISTWIDTH] IN BLOOD BY AUTOMATED COUNT: 15.8 % (ref 11.5–14.5)
HCT VFR BLD AUTO: 25.2 % (ref 40–54)
HGB BLD-MCNC: 8.3 G/DL (ref 14–18)
IMM GRANULOCYTES # BLD AUTO: 0.13 K/UL (ref 0–0.04)
IMM GRANULOCYTES NFR BLD AUTO: 2.9 % (ref 0–0.5)
LYMPHOCYTES # BLD AUTO: 0.7 K/UL (ref 1–4.8)
LYMPHOCYTES NFR BLD: 14.6 % (ref 18–48)
MCH RBC QN AUTO: 29.2 PG (ref 27–31)
MCHC RBC AUTO-ENTMCNC: 32.9 G/DL (ref 32–36)
MCV RBC AUTO: 89 FL (ref 82–98)
MONOCYTES # BLD AUTO: 0.5 K/UL (ref 0.3–1)
MONOCYTES NFR BLD: 10.3 % (ref 4–15)
NEUTROPHILS # BLD AUTO: 3.2 K/UL (ref 1.8–7.7)
NEUTROPHILS NFR BLD: 71.3 % (ref 38–73)
NRBC BLD-RTO: 0 /100 WBC
PLATELET # BLD AUTO: 123 K/UL (ref 150–350)
PMV BLD AUTO: 9.3 FL (ref 9.2–12.9)
POCT GLUCOSE: 102 MG/DL (ref 70–110)
POCT GLUCOSE: 116 MG/DL (ref 70–110)
POCT GLUCOSE: 143 MG/DL (ref 70–110)
RBC # BLD AUTO: 2.84 M/UL (ref 4.6–6.2)
WBC # BLD AUTO: 4.45 K/UL (ref 3.9–12.7)

## 2020-11-08 PROCEDURE — 97165 OT EVAL LOW COMPLEX 30 MIN: CPT

## 2020-11-08 PROCEDURE — 99233 SBSQ HOSP IP/OBS HIGH 50: CPT | Mod: ,,, | Performed by: INTERNAL MEDICINE

## 2020-11-08 PROCEDURE — 99233 PR SUBSEQUENT HOSPITAL CARE,LEVL III: ICD-10-PCS | Mod: ,,, | Performed by: INTERNAL MEDICINE

## 2020-11-08 PROCEDURE — 97162 PT EVAL MOD COMPLEX 30 MIN: CPT | Performed by: PHYSICAL THERAPIST

## 2020-11-08 PROCEDURE — 36415 COLL VENOUS BLD VENIPUNCTURE: CPT

## 2020-11-08 PROCEDURE — 63600175 PHARM REV CODE 636 W HCPCS: Performed by: STUDENT IN AN ORGANIZED HEALTH CARE EDUCATION/TRAINING PROGRAM

## 2020-11-08 PROCEDURE — 85025 COMPLETE CBC W/AUTO DIFF WBC: CPT

## 2020-11-08 PROCEDURE — 25000003 PHARM REV CODE 250: Performed by: STUDENT IN AN ORGANIZED HEALTH CARE EDUCATION/TRAINING PROGRAM

## 2020-11-08 RX ORDER — OXYCODONE AND ACETAMINOPHEN 5; 325 MG/1; MG/1
1 TABLET ORAL EVERY 6 HOURS PRN
Qty: 15 TABLET | Refills: 0 | Status: SHIPPED | OUTPATIENT
Start: 2020-11-08

## 2020-11-08 RX ADMIN — DOCUSATE SODIUM 50 MG AND SENNOSIDES 8.6 MG 1 TABLET: 8.6; 5 TABLET, FILM COATED ORAL at 08:11

## 2020-11-08 RX ADMIN — PREGABALIN 25 MG: 25 CAPSULE ORAL at 08:11

## 2020-11-08 RX ADMIN — PANTOPRAZOLE SODIUM 40 MG: 40 TABLET, DELAYED RELEASE ORAL at 08:11

## 2020-11-08 RX ADMIN — DRONABINOL 2.5 MG: 2.5 CAPSULE ORAL at 07:11

## 2020-11-08 RX ADMIN — SODIUM CHLORIDE, SODIUM LACTATE, POTASSIUM CHLORIDE, AND CALCIUM CHLORIDE 500 ML: .6; .31; .03; .02 INJECTION, SOLUTION INTRAVENOUS at 08:11

## 2020-11-08 RX ADMIN — POLYETHYLENE GLYCOL 3350 17 G: 17 POWDER, FOR SOLUTION ORAL at 08:11

## 2020-11-08 NOTE — SUBJECTIVE & OBJECTIVE
Interval History:   - patient is interested in second opinion for his prostate cancer. He is hesitant about hospice at this time. He denies severe pain. He endorses fatigue, generalized weakness.    Oncology Treatment Plan:   [No treatment plan]    Medications:  Continuous Infusions:  Scheduled Meds:   dronabinoL  2.5 mg Oral BID AC    pantoprazole  40 mg Oral Daily    polyethylene glycol  17 g Oral Daily    pregabalin  25 mg Oral BID    senna-docusate 8.6-50 mg  1 tablet Oral BID     PRN Meds:sodium chloride, sodium chloride, dextrose 50%, dextrose 50%, dextrose 50%, glucagon (human recombinant), glucose, glucose, insulin aspart U-100, sodium chloride 0.9%, sodium chloride 0.9%     Review of Systems   Constitutional: Negative for chills, diaphoresis, fatigue, fever and unexpected weight change.   HENT: Negative for sore throat and trouble swallowing.    Eyes: Negative for photophobia and visual disturbance.   Respiratory: Negative for cough, chest tightness and shortness of breath.    Cardiovascular: Negative for chest pain, palpitations and leg swelling.   Gastrointestinal: Positive for constipation. Negative for abdominal pain, diarrhea, nausea and vomiting.   Endocrine: Negative for cold intolerance and heat intolerance.   Genitourinary: Negative for difficulty urinating, dysuria and hematuria.   Musculoskeletal: Negative for arthralgias, back pain and myalgias.   Skin: Negative for color change and rash.   Neurological: Positive for weakness. Negative for dizziness, light-headedness, numbness and headaches.   Hematological: Negative for adenopathy. Does not bruise/bleed easily.     Objective:     Vital Signs (Most Recent):  Temp: 98.4 °F (36.9 °C) (11/08/20 0800)  Pulse: 81 (11/08/20 0800)  Resp: 18 (11/08/20 0800)  BP: (!) 118/53 (11/08/20 0800)  SpO2: 96 % (11/08/20 0800) Vital Signs (24h Range):  Temp:  [98.4 °F (36.9 °C)-99.2 °F (37.3 °C)] 98.4 °F (36.9 °C)  Pulse:  [77-89] 81  Resp:  [16-18] 18  SpO2:   [95 %-100 %] 96 %  BP: ()/(52-58) 118/53     Weight: 58.7 kg (129 lb 6.6 oz)  Body mass index is 18.05 kg/m².  Body surface area is 1.71 meters squared.    No intake or output data in the 24 hours ending 11/08/20 1123    Physical Exam  Constitutional:       General: He is not in acute distress.     Appearance: He is well-developed. He is not diaphoretic.      Comments: Pt tired and keeping his eyes closed.   HENT:      Head: Normocephalic and atraumatic.   Eyes:      General: No scleral icterus.        Right eye: No discharge.         Left eye: No discharge.   Cardiovascular:      Rate and Rhythm: Normal rate and regular rhythm.      Heart sounds: Normal heart sounds. No murmur. No friction rub. No gallop.    Pulmonary:      Effort: Pulmonary effort is normal. No respiratory distress.      Breath sounds: Normal breath sounds. No wheezing or rales.   Chest:      Chest wall: No tenderness.   Abdominal:      General: Bowel sounds are normal. There is no distension.      Palpations: Abdomen is soft. There is no mass.      Tenderness: There is no abdominal tenderness. There is no rebound.   Musculoskeletal: Normal range of motion.         General: No tenderness.   Skin:     General: Skin is warm and dry.      Findings: No erythema or rash.   Neurological:      Mental Status: He is alert and oriented to person, place, and time.      Coordination: Coordination normal.   Psychiatric:         Behavior: Behavior normal.         Significant Labs:   Labs have been reviewed.    Lab Results   Component Value Date    WBC 4.45 11/08/2020    HGB 8.3 (L) 11/08/2020    HCT 25.2 (L) 11/08/2020    MCV 89 11/08/2020     (L) 11/08/2020

## 2020-11-08 NOTE — PLAN OF CARE
Problem: Fall Injury Risk  Goal: Absence of Fall and Fall-Related Injury  Outcome: Met     Problem: Adult Inpatient Plan of Care  Goal: Plan of Care Review  Outcome: Met  Goal: Patient-Specific Goal (Individualization)  Outcome: Met  Goal: Absence of Hospital-Acquired Illness or Injury  Outcome: Met  Goal: Optimal Comfort and Wellbeing  Outcome: Met  Goal: Readiness for Transition of Care  Outcome: Met  Goal: Rounds/Family Conference  Outcome: Met     Problem: Skin Injury Risk Increased  Goal: Skin Health and Integrity  Outcome: Met     Problem: Diabetes Comorbidity  Goal: Blood Glucose Level Within Desired Range  Outcome: Met     Problem: Coping Ineffective  Goal: Effective Coping  Outcome: Met

## 2020-11-08 NOTE — ASSESSMENT & PLAN NOTE
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  - initially, he was set up for discharge with home hospice. However, after discussion with patient and ex-wife, he would like another opinion in the outpatient setting.  - I will attempt to set him up for a second opinion, possibly at the [facility].  - I will contact Dr. Alas this evening to inform him of updated situation.

## 2020-11-08 NOTE — PT/OT/SLP EVAL
"Occupational Therapy   Evaluation    Name: Colten Franco  MRN: [id number]  Admitting Diagnosis:  Symptomatic anemia      Recommendations:     Discharge Recommendations: home health OT  Discharge Equipment Recommendations:  none  Barriers to discharge:  None    Assessment:     Colten Franco is a 75 y.o. male with a medical diagnosis of Symptomatic anemia.   Performance deficits affecting function: weakness, impaired endurance, impaired self care skills, impaired functional mobilty, gait instability, impaired balance, decreased upper extremity function, decreased lower extremity function, decreased safety awareness, pain.      Pt  willing to participate in co-eval w/ PT/OT this date; daughter entered room in middle of eval. Daughter verified that pt was mod I w/ ADLs. Pt willing to sit EOB w/ min A and ambulated in room w/ CGA w/o AD; RW was offered to pt and pt refused stating "I don't need the walker." Pt took ~4 lateral step to HOB w/ CGA and no AD. Pt declined ADLs despite maximal effort this date stating "I will do that when I go home." Pt will continue to benefit from skilled acute OT services to maximize functional capacity for safe performance w/ functional mobility and ADLs.       Rehab Prognosis: Good; patient would benefit from acute skilled OT services to address these deficits and reach maximum level of function.       Plan:     Patient to be seen 3 x/week, 5 x/week to address the above listed problems via self-care/home management, therapeutic activities, therapeutic exercises  · Plan of Care Expires: 11/23/20  · Plan of Care Reviewed with: patient    Subjective     Chief Complaint: "I have the usual pain. My R hand hurts."  Patient/Family Comments/goals: return home w/ PLOF    Occupational Profile:  Living Environment: Pt lives w/ daughter in 2SH and no JENNIFER. Pt reports his bedroom and bathroom are located in the 1st floor and that "he never has to go to the 2nd floor." Pt uses a t/s combo w/ " "SC per daughter).   Previous level of function: Pt was mod I w/ use of AD PRN for ADLs. Daughter assists w/ IADLs as pt is no longer driving.   Roles and Routines: Pt reports he was a .   Equipment Used at Home:  rollator, shower chair  Assistance upon Discharge: Pt will have assist from daughter upon D/C. Daughter reports "pt is never along and that someone is always there."     Pain/Comfort:  · Pain Rating 1: 6/10  · Location - Side 1: Right  · Location 1: hand  · Pain Addressed 1: Cessation of Activity, Reposition  · Pain Rating Post-Intervention 1: 6/10    Patients cultural, spiritual, Synagogue conflicts given the current situation: no    Objective:     Communicated with: nurse prior to session.  Patient found HOB elevated with telemetry, peripheral IV, bed alarm upon OT entry to room.    General Precautions: Standard, fall   Orthopedic Precautions:N/A   Braces: N/A     Occupational Performance:    Bed Mobility:    · Patient completed Rolling/Turning to Left with  stand by assistance and contact guard assistance  · Patient completed Scooting/Bridging with stand by assistance  · Patient completed Supine to Sit with minimum assistance for guiding BLEs to EOB  · Patient completed Sit to Supine with minimum assistance for guiding BLEs to EOB    Functional Mobility/Transfers:  · Patient completed Sit <> Stand Transfer with contact guard assistance  with  rolling walker though pt insisted he "does not need RW;" RW placed in front just in case for safety  · Functional Mobility: With minimum encouragement, pt agreed to ambulate in room from EOB>door and returned to EOB w/ CGA and no AD. Pt w/o LOB throughout.     Activities of Daily Living:  · Upper Body Dressing: minimum assistance for managing gown     Cognitive/Visual Perceptual:  Cognitive/Psychosocial Skills:  -       Oriented to: Person, Place and Time   -       Follows Commands/attention:Follows one-step commands and Follows two-step commands  -  "      Communication: clear/fluent  -       Memory: No Deficits noted  -       Safety awareness/insight to disability: impaired     Physical Exam:  Balance:    -       good sitting balance; fair + standing   Postural examination/scapula alignment:    -       Rounded shoulders  Skin integrity: Visible skin intact  Edema:  Mild RUE  Sensation:    -       Intact  light/touch BUEs except pt w/ reports of numbeness and tingling in RUE  Upper Extremity Range of Motion:     -       Right Upper Extremity: WFL except decreased ROM 2* to weakness/pain in R shoulder   -       Left Upper Extremity: WFL  Upper Extremity Strength:    -       Right Upper Extremity: WFL except decreased strength 2* weakness/pain  -       Left Upper Extremity: WFL   Strength:    -       Right Upper Extremity: WFL except decreased strength   -       Left Upper Extremity: WFL  Fine Motor Coordination:    -       Impaired  Right hand thumb/finger opposition skills     AMPAC 6 Click ADL:  AMPAC Total Score: 20    Treatment & Education:  -Pt educuted on role of OT, POC and safe functional mobility   -Pt ambulated in room w/o AD and CGA.  -Pt declined ADLs this date; pt required min encouragement for full participation.   Education:    Patient left HOB elevated with all lines intact and call button in reach    GOALS:   Multidisciplinary Problems     Occupational Therapy Goals        Problem: Occupational Therapy Goal    Goal Priority Disciplines Outcome Interventions   Occupational Therapy Goal     OT, PT/OT Ongoing, Progressing    Description: Goals to be met by: 11/23/2020    Patient will increase functional independence with ADLs by performing:    UE Dressing with Modified [place].  LE Dressing with Modified [place].  Grooming while standing at sink with Modified [place].  Toileting from toilet with Modified [place] for hygiene and clothing management.   Supine to sit with Modified [place].  Step transfer with Modified  [place]  Toilet transfer to toilet with Modified [place].  Upper extremity exercise program x15 reps per handout, with independence.                     History:     Past Medical History:   Diagnosis Date    Anemia     Appetite loss     Cigarette smoker     DMII (diabetes mellitus, type 2)     Generalized weakness     Hypertension     Neuropathy due to chemotherapeutic drug     Prostate cancer     with metastasis       Past Surgical History:   Procedure Laterality Date    ESOPHAGOGASTRODUODENOSCOPY N/A 10/14/2020    Procedure: EGD (ESOPHAGOGASTRODUODENOSCOPY);  Surgeon: Silviano Martinez MD;  Location: [facility];  Service: Endoscopy;  Laterality: N/A;    PROSTATECTOMY  around 1998       Time Tracking:     OT Date of Treatment: 11/08/20  OT Start Time: 0945  OT Stop Time: 1000  OT Total Time (min): 15 min    Billable Minutes:Evaluation 15  Total Time (co-eval w/ PT)    Yandel Garcia OT  11/8/2020

## 2020-11-08 NOTE — PT/OT/SLP EVAL
Physical Therapy Evaluation    Patient Name:  Colten Franco   MRN:  [id number]    Recommendations:     Discharge Recommendations:  home health PT   Discharge Equipment Recommendations: none   Barriers to discharge: None    Assessment:     Colten Franco is a 75 y.o. male admitted with a medical diagnosis of Symptomatic anemia.  He presents with the following impairments/functional limitations:  impaired endurance, impaired functional mobilty, gait instability, impaired balance, decreased lower extremity function, decreased safety awareness, pain.    Rehab Prognosis: Good; patient would benefit from acute skilled PT services to address these deficits and reach maximum level of function.    Recent Surgery: * No surgery found *      Plan:     During this hospitalization, patient to be seen 5 x/week to address the identified rehab impairments via gait training, therapeutic activities, therapeutic exercises and progress toward the following goals:    · Plan of Care Expires:  11/21/20    Subjective     Chief Complaint: none stated  Patient/Family Comments/goals: to return to PLOF  Pain/Comfort:  · Pain Rating 1: 6/10  · Location - Side 1: Right  · Location 1: hand  · Pain Addressed 1: Distraction    Patients cultural, spiritual, Zoroastrianism conflicts given the current situation:      Living Environment:  Pt lives with daughter in a 2 strory home with 1 JENNIFER.  Patient doesn't go up stairs (bed/bath on 1st floor).   Prior to admission, patients level of function was independent.  Equipment used at home: rollator, shower chair.  DME owned (not currently used): none.  Upon discharge, patient will have assistance from Daughter and self.    Objective:     Communicated with Nursing prior to session.  Patient found supine with telemetry, peripheral IV  upon PT entry to room.    General Precautions: Standard,     Orthopedic Precautions:Full weight bearing   Braces: N/A     Exams:  · Cognitive Exam:  Patient is oriented  "to Person and Situation  · Gross Motor Coordination:  WFL  · Postural Exam:  Patient presented with the following abnormalities:    · -       Rounded shoulders  · -       Forward head  · -       Abnormal trunk flexion  · Skin Integrity/Edema:      · -       Skin integrity: Visible skin intact  · -       Edema: None noted .  · RLE ROM: WFL  · RLE Strength: Deficits: Quad = 4/5  · LLE ROM: WFL  · LLE Strength: Deficits: Quad = 4/5    Functional Mobility: Patient required increase time to complete task. Patient required continuous VC's to increase participation.      Bed Mobility:     · Rolling Left:  SBA and R UE bed rail usage for leverage with task.   · Rolling Right: SBA and L UE bed rail usage for leverage with task.  · Supine to Sit: Min A with Assistance to shift legs towards the EOB or to lift Trunk off bed.    · B UE support was required without any additional Assistance after positional change to prevent any (sitting) LOB.  · Sit to Supine: Min A without any Assistance to lift legs onto EOB.  Transfers:     · Sit to Stand:  Min A/HHA support with positional change.    · After transfer, Patient required VC's to maintain an "Upright Posture" to increase standing tolerance, but additional assistance required.  · A "Standing Pause" was required to prevent any symptoms of lightheaded or dizziness.      Ambulate:   · Gait:  Patient with unsteady gait.  Patient ambulated 15' with HHA/Min A (from EOB to door and back).  No O2 was utilized during ambulation. No (sitting/standing) Rest Break.    · Pt required VC's and TC's for sequencing, RW positioning, and maintaining an upright posture to increase safety and (standing) balance.    · Patient required TC's to initiate weight sifting.      AM-PAC 6 CLICK MOBILITY  Total Score:17     Patient left supine with all lines intact and call button in reach.    GOALS:   Multidisciplinary Problems     Physical Therapy Goals        Problem: Physical Therapy Goal    Goal Priority " Disciplines Outcome Goal Variances Interventions   Physical Therapy Goal     PT, PT/OT Ongoing, Progressing     Description: Goals to be met by: 2020    Patient will increase functional independence with mobility by performin. Supine to sit with Modified [place]  2. Sit to supine with Modified [place]  3. Sit to stand transfer with Modified [place]  4. Gait  x 350 feet with Modified [place] using Rolling Walker.    Recommend:  HHPT at time of discharge.                             History:     Past Medical History:   Diagnosis Date    Anemia     Appetite loss     Cigarette smoker     DMII (diabetes mellitus, type 2)     Generalized weakness     Hypertension     Neuropathy due to chemotherapeutic drug     Prostate cancer     with metastasis       Past Surgical History:   Procedure Laterality Date    ESOPHAGOGASTRODUODENOSCOPY N/A 10/14/2020    Procedure: EGD (ESOPHAGOGASTRODUODENOSCOPY);  Surgeon: Silviano Martinez MD;  Location: [facility];  Service: Endoscopy;  Laterality: N/A;    PROSTATECTOMY  around        Time Tracking:     PT Received On: 20  - co treated with OT.  PT Start Time: 0945     PT Stop Time: 1000  PT Total Time (min): 15 min     Billable Minutes: Evaluation 15 min      Craig Booth, PT  2020

## 2020-11-08 NOTE — PROGRESS NOTES
[facility]  Hematology/Oncology  Progress Note    Patient Name: Colten Franco  Admission Date: 11/5/2020  Hospital Length of Stay: 3 days  Code Status: Full Code     Subjective:     HPI:  The patient is a 76 yo M with metastatic prostate cancer, DMII, HTN, Neuropathy, anemia of chronic disease who presented to the hospital on 11/05/20 with complaint of weakness.  He states he has been feeling weak for several days similar to his symptoms when he presented to the hospital with anemia.  On presentation to the ER the patient was hypotensive and with a hemoglobin of 7.6g/dL.  The patient received a unit of PRBC's with hemoglobin of 7.5g/dL on 11/06/20.  Currently the patient complains of constipation with last BM nearly a week ago.  He denies abdominal pain, N/V.  The patient denies CP, SOB, fever, chills, night sweats, weight loss, new lumps or bumps, easy bruising or bleeding.    Interval History:   - patient is interested in second opinion for his prostate cancer. He is hesitant about hospice at this time. He denies severe pain. He endorses fatigue, generalized weakness.    Oncology Treatment Plan:   [No treatment plan]    Medications:  Continuous Infusions:  Scheduled Meds:   dronabinoL  2.5 mg Oral BID AC    pantoprazole  40 mg Oral Daily    polyethylene glycol  17 g Oral Daily    pregabalin  25 mg Oral BID    senna-docusate 8.6-50 mg  1 tablet Oral BID     PRN Meds:sodium chloride, sodium chloride, dextrose 50%, dextrose 50%, dextrose 50%, glucagon (human recombinant), glucose, glucose, insulin aspart U-100, sodium chloride 0.9%, sodium chloride 0.9%     Review of Systems   Constitutional: Negative for chills, diaphoresis, fatigue, fever and unexpected weight change.   HENT: Negative for sore throat and trouble swallowing.    Eyes: Negative for photophobia and visual disturbance.   Respiratory: Negative for cough, chest tightness and shortness of breath.    Cardiovascular: Negative  for chest pain, palpitations and leg swelling.   Gastrointestinal: Positive for constipation. Negative for abdominal pain, diarrhea, nausea and vomiting.   Endocrine: Negative for cold intolerance and heat intolerance.   Genitourinary: Negative for difficulty urinating, dysuria and hematuria.   Musculoskeletal: Negative for arthralgias, back pain and myalgias.   Skin: Negative for color change and rash.   Neurological: Positive for weakness. Negative for dizziness, light-headedness, numbness and headaches.   Hematological: Negative for adenopathy. Does not bruise/bleed easily.     Objective:     Vital Signs (Most Recent):  Temp: 98.4 °F (36.9 °C) (11/08/20 0800)  Pulse: 81 (11/08/20 0800)  Resp: 18 (11/08/20 0800)  BP: (!) 118/53 (11/08/20 0800)  SpO2: 96 % (11/08/20 0800) Vital Signs (24h Range):  Temp:  [98.4 °F (36.9 °C)-99.2 °F (37.3 °C)] 98.4 °F (36.9 °C)  Pulse:  [77-89] 81  Resp:  [16-18] 18  SpO2:  [95 %-100 %] 96 %  BP: ()/(52-58) 118/53     Weight: 58.7 kg (129 lb 6.6 oz)  Body mass index is 18.05 kg/m².  Body surface area is 1.71 meters squared.    No intake or output data in the 24 hours ending 11/08/20 1123    Physical Exam  Constitutional:       General: He is not in acute distress.     Appearance: He is well-developed. He is not diaphoretic.      Comments: Pt tired and keeping his eyes closed.   HENT:      Head: Normocephalic and atraumatic.   Eyes:      General: No scleral icterus.        Right eye: No discharge.         Left eye: No discharge.   Cardiovascular:      Rate and Rhythm: Normal rate and regular rhythm.      Heart sounds: Normal heart sounds. No murmur. No friction rub. No gallop.    Pulmonary:      Effort: Pulmonary effort is normal. No respiratory distress.      Breath sounds: Normal breath sounds. No wheezing or rales.   Chest:      Chest wall: No tenderness.   Abdominal:      General: Bowel sounds are normal. There is no distension.      Palpations: Abdomen is soft. There is no  mass.      Tenderness: There is no abdominal tenderness. There is no rebound.   Musculoskeletal: Normal range of motion.         General: No tenderness.   Skin:     General: Skin is warm and dry.      Findings: No erythema or rash.   Neurological:      Mental Status: He is alert and oriented to person, place, and time.      Coordination: Coordination normal.   Psychiatric:         Behavior: Behavior normal.         Significant Labs:   Labs have been reviewed.    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  - initially, he was set up for discharge with home hospice. However, after discussion with patient and ex-wife, he would like another opinion in the outpatient setting.  - I will attempt to set him up for a second opinion, possibly at [facility].  - I will contact Dr. Alas this evening to inform him of updated situation.            Chandler Mathur MD  Hematology/Oncology  [facility]

## 2020-11-08 NOTE — NURSING
Bedside Report given to FIDEL Garcia. Walking rounds completed. Visualized and assessed patient NAD noted. Safety precautions maintained and call light within reach.     Chart check completed.

## 2020-11-08 NOTE — NURSING
Report received from FIDEL Garcia. Visualized patient and assessed patient's overall condition and appearance. No acute distress noted. Will continue to monitor

## 2020-11-08 NOTE — PLAN OF CARE
"  Problem: Occupational Therapy Goal  Goal: Occupational Therapy Goal  Description: Goals to be met by: 11/23/2020    Patient will increase functional independence with ADLs by performing:    UE Dressing with Modified [place].  LE Dressing with Modified [place].  Grooming while standing at sink with Modified [place].  Toileting from toilet with Modified [place] for hygiene and clothing management.   Supine to sit with Modified [place].  Step transfer with Modified [place]  Toilet transfer to toilet with Modified [place].  Upper extremity exercise program x15 reps per handout, with independence.    Outcome: Ongoing, Progressing    Pt  willing to participate in co-eval w/ PT/OT this date; daughter entered room in middle of eval. Daughter verified that pt was mod I w/ ADLs. Pt willing to sit EOB w/ min A and ambulated in room w/ CGA w/o AD; RW was offered to pt and pt refused stating "I don't need the walker." Pt took ~4 lateral step to HOB w/ CGA and no AD. Pt declined ADLs despite maximal effort this date stating "I will do that when I go home." Pt will continue to benefit from skilled acute OT services to maximize functional capacity for safe performance w/ functional mobility and ADLs.      "

## 2020-11-08 NOTE — DISCHARGE SUMMARY
[facility]  Discharge Summary      Patient Name: Colten Franco  MRN: [id number]  Admission Date: 11/5/2020  Hospital Length of Stay: 3 days  Discharge Date and Time:  11/08/2020 4:55 PM  Attending Physician: No att. providers found   Discharging Provider: Sincere Traore MD  Primary Care Provider: Rosalio Azar MD      HPI:   Mr. Franco is a 76 yo M with PMHx of advanced metastatic prostate cancer, RENETTA, and bone mets who presents to the ED with complaints of feeling weak. He states this is how he felt in the past when his blood count was low. The patient states that he's not been able to get up and move around very much in the last week. Denies any history of dark colored stools, but states he hasn't had a bowel movement in about a week. His ex-wife is present and able to give some history about his weakness recently. Has intermittent fevers at home but none recently. Denies abdominal pain or nausea/vomiting. Follows with Dr. Alas for metastatic prostate cancer, recently admitted last month for symatic anemia and underwent EGD without evidence of bleeding. Placed on PPI and felt much better after blood transfusions.    In ED, found to be tachycardic, hypotensive and anemic with Hb 7.6. Patient given 1 liter of fluids with improvement in blood pressure and heart rate. Hospital Medicine was asked to admit patient for symptomatic anemia.    * No surgery found *      Hospital Course:   Patient admitted and received a total of 2 units pRBC with improvement in Hb, vitals and weakness. Multiple family conversations with ex-wife, daughter and patient about patient's prognosis. Initially spoke about hospice but wanted to speak with another physician about other options. Dr. Mathur spoke with family and patient and working to see if patient will qualify for any clinical trials at [facility]. Family in agreement with plan and follow up with PCP and next week. Family given  information on home hospice companies and outpatient social work referral placed. All questions and concerns were answered and patient was discharged home in stable condition with appropriate follow up arranged.     Consults:   Consults (From admission, onward)        Status Ordering Provider     Inpatient consult to Hematology  Once     Provider:  Edgardo Alas MD    Completed JACOBO AMADOR     Inpatient consult to Social Work  Once     Provider:  (Not yet assigned)    Completed EDGARDO ALAS     IP consult to case management  Once     Provider:  (Not yet assigned)    Completed BESSY SÁNCHEZ          No new Assessment & Plan notes have been filed under this hospital service since the last note was generated.  Service: Hospital Medicine    Final Active Diagnoses:    Diagnosis Date Noted POA    PRINCIPAL PROBLEM:  Symptomatic anemia [D64.9] 10/12/2020 Yes    Tobacco abuse [Z72.0] 10/13/2020 Yes    Prostate cancer [C61]  Yes    DMII (diabetes mellitus, type 2) [E11.9]  Yes      Problems Resolved During this Admission:    Diagnosis Date Noted Date Resolved POA    Constipation [K59.00] 11/05/2020 11/07/2020 Yes       Discharged Condition: fair    Disposition: Home or Self Care    Follow Up:  Follow-up Information     Nova Underwood MD. Go on 11/10/2020.    Specialties: Hematology and Oncology, Hematology  Why: @ 10:20am  Out-Patient Oncology Hospital Follow-Up scheduled for you.  Contact information:  [address]  [address] [place] [id number] [phone]                 Patient Instructions:      Ambulatory referral/consult to Outpatient Case Management   Referral Priority: Routine Referral Type: Consultation   Referral Reason: Specialty Services Required   Number of Visits Requested: 1     Diet Adult Regular     Notify your health care provider if you experience any of the following:  temperature >100.4     Notify your health care provider if you experience any of the following:  persistent  nausea and vomiting or diarrhea     Notify your health care provider if you experience any of the following:  severe uncontrolled pain     Notify your health care provider if you experience any of the following:  difficulty breathing or increased cough     Notify your health care provider if you experience any of the following:  severe persistent headache     Notify your health care provider if you experience any of the following:  persistent dizziness, light-headedness, or visual disturbances     Notify your health care provider if you experience any of the following:  increased confusion or weakness     Activity as tolerated       Significant Diagnostic Studies: Labs:   CBC   Recent Labs   Lab 11/08/20  0614   WBC 4.45   HGB 8.3*   HCT 25.2*   *       Pending Diagnostic Studies:     None         Medications:  Reconciled Home Medications:      Medication List      START taking these medications    oxyCODONE-acetaminophen 5-325 mg per tablet  Commonly known as: PERCOCET  Take 1 tablet by mouth every 6 (six) hours as needed for Pain.        CONTINUE taking these medications    dronabinoL 2.5 MG capsule  Commonly known as: MARINOL  Take 1 capsule (2.5 mg total) by mouth 2 (two) times daily before meals. Take 1 capsule (2.5mg total) by mouth in the AM before breakfast, Take 2 capsules (5mg total) by mouth in the PM before dinner.     pantoprazole 40 MG tablet  Commonly known as: PROTONIX  Take 1 tablet (40 mg total) by mouth once daily.     pregabalin 25 MG capsule  Commonly known as: LYRICA  Take 1 capsule (25 mg total) by mouth 2 (two) times daily.        STOP taking these medications    ondansetron IVPB  Commonly known as: ZOFRAN            Indwelling Lines/Drains at time of discharge:   Lines/Drains/Airways     None                 Time spent on the discharge of patient: 45 minutes  Patient was seen and examined on the date of discharge and determined to be suitable for discharge.         Sincere Traore,  MD  Department of Hospital Medicine  [facility]

## 2020-11-08 NOTE — HOSPITAL COURSE
Patient admitted and received a total of 2 units pRBC with improvement in Hb, vitals and weakness. Multiple family conversations with ex-wife, daughter and patient about patient's prognosis. Initially spoke about hospice but wanted to speak with another physician about other options. Dr. Mathur spoke with family and patient and working to see if patient will qualify for any clinical trials at [facility]. Family in agreement with plan and follow up with PCP and next week. Family given information on home hospice companies and outpatient social work referral placed. All questions and concerns were answered and patient was discharged home in stable condition with appropriate follow up arranged.

## 2020-11-08 NOTE — PLAN OF CARE
Problem: Fall Injury Risk  Goal: Absence of Fall and Fall-Related Injury  Outcome: Ongoing, Progressing     Problem: Adult Inpatient Plan of Care  Goal: Plan of Care Review  Outcome: Ongoing, Progressing  Goal: Optimal Comfort and Wellbeing  Outcome: Ongoing, Progressing     Problem: Coping Ineffective  Goal: Effective Coping  Outcome: Ongoing, Progressing  Intervention: Support and Enhance Coping Strategies  Flowsheets (Taken 11/7/2020 6554)  Supportive Measures: decision-making supported  Environmental Support:   calm environment promoted   rest periods encouraged    Pt remained free of falls during current shift. Pt turned q2h.  Pt appeared to be in no distress and did not receive any prn pain medications. Pt ambulated to bathroom. Generalized weakness.  Plan of care and fall precautions reviewed with pt and verbalized understanding. Bed locked, lowered, SR up x2 and call light placed within reach.

## 2020-11-08 NOTE — PLAN OF CARE
Problem: Physical Therapy Goal  Goal: Physical Therapy Goal  Description: Goals to be met by: 2020    Patient will increase functional independence with mobility by performin. Supine to sit with Modified [place]  2. Sit to supine with Modified [place]  3. Sit to stand transfer with Modified [place]  4. Gait  x 350 feet with Modified [place] using Rolling Walker.    Recommend:  HHPT at time of discharge.       Craig Booth, PT  2020     Outcome: Ongoing, Progressing

## 2020-11-09 ENCOUNTER — TELEPHONE (OUTPATIENT)
Dept: HEMATOLOGY/ONCOLOGY | Facility: CLINIC | Age: 75
End: 2020-11-09

## 2020-11-09 NOTE — TELEPHONE ENCOUNTER
Patient requested second opinion after being seen in hospital over the weekend by Dr. Mathur. Message received by nurse navigator to schedule the patient at [facility] with one of our  oncologists. Contacted patient's daughter who agreed to appt with Dr. Pederson on Thursday at 1pm. Time, date, location of appointment discussed. All questions answered . Contact information provided for further needs.

## 2020-11-09 NOTE — TELEPHONE ENCOUNTER
I called the daughter whom instructed me that she was aware of her father's appt with Dr Pederson on 11/12/20 and that she did want to cancel her father's appt with Dr Underwood on 11/10/20.  I instructed her that we would take care of cancelling his appt on 11/10/20.  I let lewis know that I would follow up on the treatment decision from Dr Pederson after their appt on 11/12/20/  She expressed understanding.  All questions were answered to her satisfaction.    Edgardo Alas MD  Hematology and Oncology  [facility]  Office:[phone]  Fax: [phone]

## 2020-11-09 NOTE — PT/OT/SLP DISCHARGE
Occupational Therapy Discharge Summary    Colten Franco  MRN: [id number]   Principal Problem: Symptomatic anemia      Patient Discharged from acute Occupational Therapy on 11/8/2020.  Please refer to prior OT notes for functional status.    Assessment:      Patient appropriate for care in another setting.    Objective:     GOALS:   Multidisciplinary Problems     Occupational Therapy Goals        Problem: Occupational Therapy Goal    Goal Priority Disciplines Outcome Interventions   Occupational Therapy Goal     OT, PT/OT Ongoing, Progressing    Description: Goals to be met by: 11/23/2020    Patient will increase functional independence with ADLs by performing:    UE Dressing with Modified [place].  LE Dressing with Modified [place].  Grooming while standing at sink with Modified [place].  Toileting from toilet with Modified [place] for hygiene and clothing management.   Supine to sit with Modified [place].  Step transfer with Modified [place]  Toilet transfer to toilet with Modified [place].  Upper extremity exercise program x15 reps per handout, with independence.                     Reasons for Discontinuation of Therapy Services  Transfer to alternate level of care.      Plan:     Patient Discharged to: Home with Home Health Service recommended    Yandel Garcia OT  11/9/2020

## 2020-11-11 ENCOUNTER — TELEPHONE (OUTPATIENT)
Dept: HEMATOLOGY/ONCOLOGY | Facility: CLINIC | Age: 75
End: 2020-11-11

## 2020-11-11 ENCOUNTER — PATIENT MESSAGE (OUTPATIENT)
Dept: HEMATOLOGY/ONCOLOGY | Facility: CLINIC | Age: 75
End: 2020-11-11

## 2020-11-11 DIAGNOSIS — R31.9 HEMATURIA, UNSPECIFIED TYPE: Primary | ICD-10-CM

## 2020-11-11 NOTE — TELEPHONE ENCOUNTER
I called and spoke with the patient's daughter whom stated the patient was peeing out blood.  I informed her that he would need to go to the ER if he starts having difficulty urinating.  She states that he was not having difficulty urinating.  I informed her that I would set him up with an appt with Urology.  She expressed understanding.  All questions were answered to her satisfaction.    Edgardo Alas MD  Hematology and Oncology  [facility]  Office:[phone]  Fax: [phone]

## 2020-11-11 NOTE — PROGRESS NOTES
Subjective:       Patient ID: Colten Franco is a 75 y.o. male.    Chief Complaint: No chief complaint on file.    HPI     The patient location is: home   The chief complaint leading to consultation is:     Visit type: audiovisual    Face to Face time with patient: 30 minutes  45 minutes of total time spent on the encounter, which includes face to face time and non-face to face time preparing to see the patient (eg, review of tests), Obtaining and/or reviewing separately obtained history, Documenting clinical information in the electronic or other health record, Independently interpreting results (not separately reported) and communicating results to the patient/family/caregiver, or Care coordination (not separately reported).   Each patient to whom he or she provides medical services by telemedicine is:  (1) informed of the relationship between the physician and patient and the respective role of any other health care provider with respect to management of the patient; and (2) notified that he or she may decline to receive medical services by telemedicine and may withdraw from such care at any time.    Notes:   Daughter present- Kitty Fuller lives with her and is in background in bed  She does most of the talking as he is feeling poorly  He came to live with her at the end of 2020- she went to visit and saw he could not walk or use his right hand- he was in the hospital and she decided to bring him home with her  Continued decline since then  Requires blood transfusions more frequently  They are considering Hospice now as nothing working for pain really  Fiancee has to pick him up to move him  Poor PO intake-- she gives liquids and smoothies  Trouble swallowing  + weight loss    PMH:  HTN  DM    FH:  No other prostate cancer  Mother and sister had breast cancers; mother  of metastatic breast cancer    SH:  Had been living alone  Retired     Oncology History:  - multiple lines of  therapy historically  - recently bone marrow involvement seen with tear drop cells in the peripheral blood suggestive of a myelophthisic process.  - Diagnosed in 1998 and underwent prostatectomy.in [place]  Required no additional therapy at that time  At some point he started Lupron/XRT  - came back as metastatic disease she thinks around 2007  - spread to bone around 5 or 6 years  - He was previously treated by Dr. Christiansen in [facility]. Selvin has been previously treated with Lupron, palliative radiation, provenge (xofigo), zytiga (abiratoerone) and prednisone, enzalutamide in combination with [id number] (EZH2 inhibitor) as part of a phase ½ trial, HMA528 trial (start 3/03/20) from which the patient developed hand and foot syndrome after the second cycle.  Most recently the patient was treated with casodex and Lupron starting 9/10/20 after his PSA continued to climb.      Last scans:  - 10/22/2020 CT scans  FINDINGS:  Within the visualized lower neck, there is a low-density structure measuring approximately 1.9 x 1.5 cm in size distorting the left internal jugular vein.  This is incompletely included on the examination but may represent an enlarged left cervical lymph node.  The thyroid gland appears enlarged with heterogeneity of density likely related to thyroid nodules.  Further evaluation with thyroid ultrasound examination is recommended both to evaluate the thyroid gland as well as the suspected left cervical adenopathy.  CT examination of the soft tissues of the neck with contrast could also be obtained to evaluate the suspected cervical adenopathy if clinically indicated.  The heart does not appear enlarged.  There is a small pericardial effusion.  A small amount of atherosclerotic calcification is present within the coronary arteries.  Atherosclerotic calcification is also present within the thoracic aorta which is normal in caliber without evidence for thoracic aortic aneurysm or aortic dissection.   No hilar or mediastinal adenopathy is identified.  The lungs demonstrate mild to moderate emphysematous changes.  There are mild dependent atelectatic changes within the lungs.  The lungs are otherwise clear.  There is no evidence for pneumothorax or pleural effusions.  The bones demonstrate widespread changes with patchy sclerosis noted involving the proximal humeri, the right scapula.  The sternum, numerous ribs, numerous thoracic and lumbar vertebra, the sacrum, the pelvic bones, and the proximal femurs consistent with widespread osseous metastatic disease in this patient with history of prostate carcinoma.  The liver is mildly enlarged and is homogeneous in density with no focal liver lesions identified.  The gallbladder is present and appears unremarkable.  There is no evidence for intrahepatic or extrahepatic biliary dilatation.  The spleen, stomach, and pancreas appear unremarkable.  There is thickening of the left adrenal gland likely related to adrenal hyperplasia or ill-defined adrenal adenoma.  There are multiple low-density right renal masses present with the largest measuring 1.6 x 1.7 cm in size most consistent with multiple renal cysts.  The kidneys are noted to concentrate contrast symmetrically.  There is no evidence for hydronephrosis.  Atherosclerotic calcification is present within the abdominal aorta and there is mild fusiform aneurysmal dilatation of the infrarenal abdominal aorta which measures approximately 3.5 x 3.2 cm in greatest transverse dimensions.  The aneurysm extends to the level of the aortic bifurcation but does not involve the common iliac arteries.  No para-aortic lymphadenopathy is identified.  There are no CT findings to suggest appendicitis.  There are no dilated loops of bowel evident.  There are surgical changes related to prostatectomy.  The urinary bladder appears unremarkable.  There is no evidence for pelvic or inguinal lymphadenopathy.  No ascites is  identified.  Impression:  Widespread bony changes consistent with diffuse osseous metastatic disease.  Low-density structure within the left lower neck incompletely included on this examination possibly representing an enlarged lymph node compressing the left internal jugular vein.  This could be further evaluated with either dedicated CT examination of the soft tissues of the neck with contrast or ultrasound evaluation of the neck.  Enlarged heterogeneous thyroid gland with nodular hypodensities likely representing multiple thyroid nodules.  Further evaluation with thyroid ultrasound examination is recommended.  Mild fusiform aneurysmal dilatation of the infrarenal abdominal aorta measuring up to 3.5 x 3.2 cm in transverse dimensions.  Small pericardial effusion.  Mild to moderate emphysematous changes.  Mild hepatomegaly.  Low-density thickening of the left adrenal gland possibly due to adrenal hyperplasia or ill-defined adrenal adenoma.  Multiple right renal cysts.  Surgical changes within the pelvis likely related to prior prostatectomy.    Bone scan:  FINDINGS:  There are innumerable foci of increased tracer accumulation identified most consistent with diffuse osseous metastatic disease in keeping with findings on the patient's recent CT examination of the chest, abdomen, and pelvis.  There are foci of increased tracer accumulation identified throughout the thoracic and lumbar spine, ribs, pelvis, femurs, humeri, tibias, fibulas, radii, ulnas, and sternum.  There is relatively decreased tracer accumulation within the proximal left femur and left hemipelvis which could be related to prior radiation therapy in this region as there are sclerotic bone lesions identified on the CT examination within the proximal left femur.  There is prominent tracer accumulation within both humeral heads, more pronounced on the right than on the left.  There is minimal uptake of activity within the kidneys.  The urinary bladder  appear normal.  Impression:  Abnormal examination consistent with widespread osseous metastatic disease.  Findings are most pronounced within the humeral heads, greater on the right than on the left.  Relatively decreased tracer accumulation is identified within the proximal left femur and left hemipelvis possibly related to prior radiation therapy in this region.     No treatment since May or June    Review of Systems   Constitutional: Positive for activity change, appetite change, fatigue and unexpected weight change.   HENT: Positive for trouble swallowing.    Respiratory: Positive for shortness of breath. Negative for chest tightness and wheezing.    Cardiovascular: Negative for chest pain, palpitations and leg swelling.   Gastrointestinal: Negative for change in bowel habit and change in bowel habit.   Musculoskeletal: Positive for arthralgias, back pain, gait problem and joint deformity.   Neurological: Positive for weakness, numbness, disturbances in coordination and coordination difficulties.   Hematological: Bruises/bleeds easily.         Objective:      Physical Exam Virtual visit   Bedridden  Assessment:       1. Symptomatic anemia    2. Prostate cancer metastatic to bone    3. Declining performance status        Plan:       We discussed Hospice  She is interested in this but concerned regarding transfusions as they have helped  I did advise her they are not likely to help for long and may cause issues soon with volume overload making them unsafe  I explained that he is no longer producing rbcs well (reviewed labs and retic count with her) meaning marrow is infiltrated (or less likely he has MDS/leukemia). Regardless prostate cancer infiltration in marrow acts very much like a leukemia. There is no safe treatment to offer and she is aware of this.  We discussed comfort and QOL and she needs help as he is full care and having pain control issues.    Kitty May to assist

## 2020-11-11 NOTE — TELEPHONE ENCOUNTER
Tried setting up appointment for Urology. Referal/Consult still pending. Was informed to try back tomorrow. Informed  that Dr. Alas wanted the pt to be scheduled ASAP.  said that she couldn't do anything until it is cleared. Dr. Alas informed.   ~Carlier

## 2020-11-12 ENCOUNTER — DOCUMENTATION ONLY (OUTPATIENT)
Dept: HEMATOLOGY/ONCOLOGY | Facility: CLINIC | Age: 75
End: 2020-11-12

## 2020-11-12 ENCOUNTER — OFFICE VISIT (OUTPATIENT)
Dept: HEMATOLOGY/ONCOLOGY | Facility: CLINIC | Age: 75
End: 2020-11-12
Payer: OTHER GOVERNMENT

## 2020-11-12 DIAGNOSIS — C61 PROSTATE CANCER METASTATIC TO BONE: Primary | ICD-10-CM

## 2020-11-12 DIAGNOSIS — R53.81 DECLINING PERFORMANCE STATUS: ICD-10-CM

## 2020-11-12 DIAGNOSIS — D64.9 SYMPTOMATIC ANEMIA: Primary | ICD-10-CM

## 2020-11-12 DIAGNOSIS — C79.51 PROSTATE CANCER METASTATIC TO BONE: Primary | ICD-10-CM

## 2020-11-12 DIAGNOSIS — C61 PROSTATE CANCER METASTATIC TO BONE: ICD-10-CM

## 2020-11-12 DIAGNOSIS — C79.51 PROSTATE CANCER METASTATIC TO BONE: ICD-10-CM

## 2020-11-12 PROCEDURE — 99215 OFFICE O/P EST HI 40 MIN: CPT | Mod: 95,,, | Performed by: INTERNAL MEDICINE

## 2020-11-12 PROCEDURE — 99215 PR OFFICE/OUTPT VISIT, EST, LEVL V, 40-54 MIN: ICD-10-PCS | Mod: 95,,, | Performed by: INTERNAL MEDICINE

## 2020-11-12 RX ORDER — OXYCODONE AND ACETAMINOPHEN 5; 325 MG/1; MG/1
1 TABLET ORAL EVERY 4 HOURS PRN
Qty: 90 TABLET | Refills: 0 | Status: SHIPPED | OUTPATIENT
Start: 2020-11-12

## 2020-11-12 NOTE — PROGRESS NOTES
In response to in basket message from Dr. Pederson informing pt needs hospice and prefers an agency that can administer blood transfusions at home, NITHIN called pt's daughter, Kitty to ask if she had a preference in agencies.  She said she did not have a preference.  NITHIN called [facility] as they provide blood transfusions (Taisha  [phone]).  Taisha explained due to lab work and typing blood etc pt would not receive first transfusion until 11-18-20.  NITHIN called pt's daughter, Kitty ([phone]) and explained this.  She said her father is very weak and in need of a transfusion currently.  Nithin and Kitty discussed outpatient transfusion followed by admission to hospice, however this would delay pt getting needed pain meds.  Kitty said she is confident that she and her fiancee could get her father to a transfusion center.  NITHIN messaged karan ([facility]) who informed [facility] and [facility] have no chairs tomorrow.  S[facility]a had pt added to the waiting list here at [place].  NITHIN messaged Dr. Pederson, provided the information above.  Asked for consideration of a pain med Rx to bridge the gap between now and hospice admission and for transfusion orders.    Taisha ([facility]) tried to call Kitty directly several times and LVMs.  NITHIN reached Kitty and provided her the number to [facility] and asked that she call to ask any questions she might have.  Kitty has not made a decision about how she wants to proceed.  NITHIN remains available and will follow.

## 2020-11-13 ENCOUNTER — DOCUMENTATION ONLY (OUTPATIENT)
Dept: HEMATOLOGY/ONCOLOGY | Facility: CLINIC | Age: 75
End: 2020-11-13

## 2020-11-13 ENCOUNTER — TELEPHONE (OUTPATIENT)
Dept: HEMATOLOGY/ONCOLOGY | Facility: CLINIC | Age: 75
End: 2020-11-13

## 2020-11-13 NOTE — TELEPHONE ENCOUNTER
----- Message from Joan Pederson MD sent at 11/12/2020  4:58 PM CST -----  I put in the labs and Hospice orders  Pain med sent  Thanks!!  ----- Message -----  From: Kitty Sanchez LCSW  Sent: 11/12/2020   4:34 PM CST  To: Joan Pederson MD    ...unless pt can medically wait until Wed for a transfusion.  Thanks again.  ----- Message -----  From: Kitty Sanchez LCSW  Sent: 11/12/2020   4:27 PM CST  To: MD Dr. Bg Rodriguez,  We found a hospice that can do transfusions, however they will have to do lab work prior to administering so the earliest transfusion would be Wed-ish.  Kitty and I spoke about an outpatient transfusion to carry pt over until hospice can do it.  This will delay hospice admission.  She is still deciding what she wants to do. While we work with scheduling, etc can you provide them a Rx for pain meds?  Also, just in case they decide to do outpatient transfusion will you order type and screen and blood?  Thank you.  Kitty  [phone]  ----- Message -----  From: Joan Pederson MD  Sent: 11/12/2020   1:27 PM CST  To: Kitty Sanchez LCSW    Kitty is his daughter's name  They need Hospice  She is interested in this but concerned regarding transfusions as they have helped  I did advise her they are not likely to help for long and may cause issues soon with volume overload making them unsafe  I explained that he is no longer producing rbcs well (reviewed labs and retic count with her) meaning marrow is infiltrated (or less likely he has MDS/leukemia). Regardless prostate cancer infiltration in marrow acts very much like a leukemia. There is no safe treatment to offer and she is aware of this.  We discussed comfort and QOL and she needs help as he is full care and having pain control issues.    Thanks  Joan

## 2020-11-13 NOTE — PROGRESS NOTES
Nithin spoke with pt's daughter, Kitty ([phone]) this morning.  She asked SW to find out if there [facility] would transfuse blood at home and if they provide inpatient hospice services on the [facility].  It was agreed that if [facility] doesn't provide these services then NITHIN would refer to [facility] for hospice services.  Nithin called [facility], (Renetta [phone]).  She informed SW they do not transfuse blood at home and do not have inpatient services on the [facility].  NITHIN also called [facility] ([facility] [phone]).  She informed that they do not provide either of these services.    NITHIN sent referral to [facility] ([facility] [phone]) and called pt's daughter to inform her of referral.  She verbalized understanding and confirmed SW phone number for future needs.  NITHIN remains available.

## 2020-11-20 ENCOUNTER — TELEPHONE (OUTPATIENT)
Dept: ORTHOPEDICS | Facility: CLINIC | Age: 75
End: 2020-11-20

## 2021-01-08 ENCOUNTER — TELEPHONE (OUTPATIENT)
Dept: ORTHOPEDICS | Facility: CLINIC | Age: 76
End: 2021-01-08

## 2021-01-26 ENCOUNTER — TELEPHONE (OUTPATIENT)
Dept: HEMATOLOGY/ONCOLOGY | Facility: CLINIC | Age: 76
End: 2021-01-26

## 2023-06-22 NOTE — HPI
Colten Franco is a 75 y.o. male that (in part)  has a past medical history of DMII (diabetes mellitus, type 2), Neuropathy due to chemotherapeutic drug, and Prostate cancer.  has a past surgical history that includes Prostatectomy. Presents to [facility] after receiving a phone call from his primary care physician who stated that the patient had abnormal lab results.  He was anemic.  His hemoglobin was 5.6.  Patient complains of generalized weakness, fatigue, shortness of breath at rest, and dyspnea with exertion.  Some dizziness when he stands too quickly.  Denies hemoptysis, hematemesis, melena, hematochezia, or hematuria.  No other obvious blood loss.  History of prostate cancer and has received multiple rounds of chemotherapy as well as palliative radiation.  He is being followed by Hematology-Oncology as an outpatient.  He was recently seen by Dr. CADEN Alas.      In the emergency department routine laboratory studies confirmed normocytic anemia with a hemoglobin of 5.3.  No other significant lab abnormalities noted other than decreased TIBC and transferrin.  2 units of PRBCs were ordered for transfusion.  Will check after 2nd unit.  Patient understands he may need additional blood after that.  Recommendations given by Hematology-Oncology for further anemia workup.     Hospital medicine has been asked to admit to inpatient for further evaluation and treatment.    Pulmonary infiltrates on CXR